# Patient Record
Sex: FEMALE | Race: BLACK OR AFRICAN AMERICAN | Employment: OTHER | URBAN - METROPOLITAN AREA
[De-identification: names, ages, dates, MRNs, and addresses within clinical notes are randomized per-mention and may not be internally consistent; named-entity substitution may affect disease eponyms.]

---

## 2024-06-28 ENCOUNTER — TELEPHONE (OUTPATIENT)
Dept: WOUND CARE | Age: 69
End: 2024-06-28

## 2024-06-28 NOTE — TELEPHONE ENCOUNTER
Late Entry:  RN called patient 6/27/24 around 4 pm to notify patient that we are unable to obtain assistance from wound care nurses from home care because the orders placed are not a skilled nursing order. Order is to paint left toe wound and right anterior lower leg wound with betadine and let air dry. Patient understood and felt this skill she was able to perform independently at home. She has no further needs at this time. Electronically signed by Alyse Mittal RN on 6/28/2024 at 9:24 AM

## 2024-08-27 ENCOUNTER — HOSPITAL ENCOUNTER (OUTPATIENT)
Dept: WOUND CARE | Age: 69
Discharge: HOME | End: 2024-08-27
Attending: NURSE PRACTITIONER

## 2024-08-29 ENCOUNTER — HOSPITAL ENCOUNTER (OUTPATIENT)
Dept: WOUND CARE | Age: 69
Discharge: HOME | End: 2024-08-29
Attending: NURSE PRACTITIONER

## 2024-09-02 ENCOUNTER — APPOINTMENT (OUTPATIENT)
Dept: GENERAL RADIOLOGY | Age: 69
DRG: 278 | End: 2024-09-02
Payer: MEDICARE

## 2024-09-02 ENCOUNTER — HOSPITAL ENCOUNTER (INPATIENT)
Age: 69
LOS: 5 days | Discharge: HOME HEALTH | DRG: 278 | End: 2024-09-07
Attending: STUDENT IN AN ORGANIZED HEALTH CARE EDUCATION/TRAINING PROGRAM | Admitting: SPECIALIST
Payer: MEDICARE

## 2024-09-02 DIAGNOSIS — I96 DRY GANGRENE (HCC): ICD-10-CM

## 2024-09-02 DIAGNOSIS — I73.9 PAD (PERIPHERAL ARTERY DISEASE) (HCC): ICD-10-CM

## 2024-09-02 DIAGNOSIS — E13.621 DIABETIC FOOT ULCER ASSOCIATED WITH OTHER SPECIFIED DIABETES MELLITUS, UNSPECIFIED LATERALITY, UNSPECIFIED PART OF FOOT, UNSPECIFIED ULCER STAGE (HCC): Primary | ICD-10-CM

## 2024-09-02 DIAGNOSIS — L97.509 DIABETIC FOOT ULCER ASSOCIATED WITH OTHER SPECIFIED DIABETES MELLITUS, UNSPECIFIED LATERALITY, UNSPECIFIED PART OF FOOT, UNSPECIFIED ULCER STAGE (HCC): Primary | ICD-10-CM

## 2024-09-02 LAB
ANION GAP SERPL CALCULATED.3IONS-SCNC: 15 MMOL/L (ref 3–16)
BASE EXCESS BLDV CALC-SCNC: 4.1 MMOL/L
BASOPHILS # BLD: 0 K/UL (ref 0–0.2)
BASOPHILS NFR BLD: 0 %
BUN SERPL-MCNC: 30 MG/DL (ref 7–20)
CALCIUM SERPL-MCNC: 9 MG/DL (ref 8.3–10.6)
CHLORIDE SERPL-SCNC: 97 MMOL/L (ref 99–110)
CO2 BLDV-SCNC: 33 MMOL/L
CO2 SERPL-SCNC: 23 MMOL/L (ref 21–32)
COHGB MFR BLDV: 7.2 %
CREAT SERPL-MCNC: 2.6 MG/DL (ref 0.6–1.2)
CRP SERPL-MCNC: 60.4 MG/L (ref 0–5.1)
DEPRECATED RDW RBC AUTO: 20.7 % (ref 12.4–15.4)
EOSINOPHIL # BLD: 0.2 K/UL (ref 0–0.6)
EOSINOPHIL NFR BLD: 3 %
ERYTHROCYTE [SEDIMENTATION RATE] IN BLOOD BY WESTERGREN METHOD: 15 MM/HR (ref 0–30)
GFR SERPLBLD CREATININE-BSD FMLA CKD-EPI: 19 ML/MIN/{1.73_M2}
GLUCOSE SERPL-MCNC: 86 MG/DL (ref 70–99)
HCO3 BLDV-SCNC: 31 MMOL/L (ref 23–29)
HCT VFR BLD AUTO: 40.7 % (ref 36–48)
HGB BLD-MCNC: 13 G/DL (ref 12–16)
LACTATE BLDV-SCNC: 1.4 MMOL/L (ref 0.4–2)
LYMPHOCYTES # BLD: 1.1 K/UL (ref 1–5.1)
LYMPHOCYTES NFR BLD: 15 %
MAGNESIUM SERPL-MCNC: 2.17 MG/DL (ref 1.8–2.4)
MCH RBC QN AUTO: 26 PG (ref 26–34)
MCHC RBC AUTO-ENTMCNC: 32 G/DL (ref 31–36)
MCV RBC AUTO: 81.3 FL (ref 80–100)
METHGB MFR BLDV: 0.5 %
MONOCYTES # BLD: 0.4 K/UL (ref 0–1.3)
MONOCYTES NFR BLD: 5 %
NEUTROPHILS # BLD: 5.5 K/UL (ref 1.7–7.7)
NEUTROPHILS NFR BLD: 77 %
O2 THERAPY: ABNORMAL
PCO2 BLDV: 54.3 MMHG (ref 40–50)
PH BLDV: 7.36 [PH] (ref 7.35–7.45)
PLATELET # BLD AUTO: 289 K/UL (ref 135–450)
PLATELET BLD QL SMEAR: ADEQUATE
PMV BLD AUTO: 6.9 FL (ref 5–10.5)
PO2 BLDV: <30 MMHG
POTASSIUM SERPL-SCNC: 3.8 MMOL/L (ref 3.5–5.1)
RBC # BLD AUTO: 5.01 M/UL (ref 4–5.2)
SAO2 % BLDV: 53 %
SLIDE REVIEW: ABNORMAL
SODIUM SERPL-SCNC: 135 MMOL/L (ref 136–145)
TROPONIN, HIGH SENSITIVITY: 350 NG/L (ref 0–14)
WBC # BLD AUTO: 7.2 K/UL (ref 4–11)

## 2024-09-02 PROCEDURE — 2580000003 HC RX 258: Performed by: SPECIALIST

## 2024-09-02 PROCEDURE — 85025 COMPLETE CBC W/AUTO DIFF WBC: CPT

## 2024-09-02 PROCEDURE — 1200000000 HC SEMI PRIVATE

## 2024-09-02 PROCEDURE — 6360000002 HC RX W HCPCS: Performed by: SPECIALIST

## 2024-09-02 PROCEDURE — 2580000003 HC RX 258: Performed by: STUDENT IN AN ORGANIZED HEALTH CARE EDUCATION/TRAINING PROGRAM

## 2024-09-02 PROCEDURE — 94640 AIRWAY INHALATION TREATMENT: CPT

## 2024-09-02 PROCEDURE — 73630 X-RAY EXAM OF FOOT: CPT

## 2024-09-02 PROCEDURE — 80048 BASIC METABOLIC PNL TOTAL CA: CPT

## 2024-09-02 PROCEDURE — 85652 RBC SED RATE AUTOMATED: CPT

## 2024-09-02 PROCEDURE — 84484 ASSAY OF TROPONIN QUANT: CPT

## 2024-09-02 PROCEDURE — 6370000000 HC RX 637 (ALT 250 FOR IP): Performed by: SPECIALIST

## 2024-09-02 PROCEDURE — 87040 BLOOD CULTURE FOR BACTERIA: CPT

## 2024-09-02 PROCEDURE — 96365 THER/PROPH/DIAG IV INF INIT: CPT

## 2024-09-02 PROCEDURE — 83036 HEMOGLOBIN GLYCOSYLATED A1C: CPT

## 2024-09-02 PROCEDURE — 73590 X-RAY EXAM OF LOWER LEG: CPT

## 2024-09-02 PROCEDURE — 36415 COLL VENOUS BLD VENIPUNCTURE: CPT

## 2024-09-02 PROCEDURE — 83735 ASSAY OF MAGNESIUM: CPT

## 2024-09-02 PROCEDURE — 96368 THER/DIAG CONCURRENT INF: CPT

## 2024-09-02 PROCEDURE — 86140 C-REACTIVE PROTEIN: CPT

## 2024-09-02 PROCEDURE — 82803 BLOOD GASES ANY COMBINATION: CPT

## 2024-09-02 PROCEDURE — 99285 EMERGENCY DEPT VISIT HI MDM: CPT

## 2024-09-02 PROCEDURE — 6360000002 HC RX W HCPCS: Performed by: STUDENT IN AN ORGANIZED HEALTH CARE EDUCATION/TRAINING PROGRAM

## 2024-09-02 PROCEDURE — 83605 ASSAY OF LACTIC ACID: CPT

## 2024-09-02 RX ORDER — OXYCODONE AND ACETAMINOPHEN 5; 325 MG/1; MG/1
1 TABLET ORAL EVERY 6 HOURS PRN
Status: DISCONTINUED | OUTPATIENT
Start: 2024-09-02 | End: 2024-09-05

## 2024-09-02 RX ORDER — BUDESONIDE AND FORMOTEROL FUMARATE DIHYDRATE 160; 4.5 UG/1; UG/1
2 AEROSOL RESPIRATORY (INHALATION)
Status: DISCONTINUED | OUTPATIENT
Start: 2024-09-02 | End: 2024-09-07 | Stop reason: HOSPADM

## 2024-09-02 RX ORDER — ALBUTEROL SULFATE 90 UG/1
2 AEROSOL, METERED RESPIRATORY (INHALATION) EVERY 6 HOURS PRN
Status: DISCONTINUED | OUTPATIENT
Start: 2024-09-02 | End: 2024-09-07 | Stop reason: HOSPADM

## 2024-09-02 RX ORDER — LINEZOLID 2 MG/ML
600 INJECTION, SOLUTION INTRAVENOUS EVERY 12 HOURS
Status: DISCONTINUED | OUTPATIENT
Start: 2024-09-03 | End: 2024-09-06

## 2024-09-02 RX ORDER — ACETAMINOPHEN 500 MG
500 TABLET ORAL EVERY 6 HOURS PRN
Status: DISCONTINUED | OUTPATIENT
Start: 2024-09-02 | End: 2024-09-07 | Stop reason: HOSPADM

## 2024-09-02 RX ORDER — LINEZOLID 2 MG/ML
600 INJECTION, SOLUTION INTRAVENOUS ONCE
Status: COMPLETED | OUTPATIENT
Start: 2024-09-02 | End: 2024-09-02

## 2024-09-02 RX ORDER — CARVEDILOL 6.25 MG/1
6.25 TABLET ORAL 2 TIMES DAILY WITH MEALS
Status: DISCONTINUED | OUTPATIENT
Start: 2024-09-02 | End: 2024-09-07 | Stop reason: HOSPADM

## 2024-09-02 RX ORDER — SODIUM CHLORIDE 9 MG/ML
INJECTION, SOLUTION INTRAVENOUS CONTINUOUS
Status: DISCONTINUED | OUTPATIENT
Start: 2024-09-02 | End: 2024-09-03

## 2024-09-02 RX ORDER — PANTOPRAZOLE SODIUM 40 MG/1
40 TABLET, DELAYED RELEASE ORAL
Status: DISCONTINUED | OUTPATIENT
Start: 2024-09-03 | End: 2024-09-07 | Stop reason: HOSPADM

## 2024-09-02 RX ORDER — ISOSORBIDE MONONITRATE 30 MG/1
30 TABLET, EXTENDED RELEASE ORAL DAILY
Status: DISCONTINUED | OUTPATIENT
Start: 2024-09-02 | End: 2024-09-07 | Stop reason: HOSPADM

## 2024-09-02 RX ORDER — FLUTICASONE PROPIONATE 50 MCG
1 SPRAY, SUSPENSION (ML) NASAL DAILY PRN
Status: DISCONTINUED | OUTPATIENT
Start: 2024-09-02 | End: 2024-09-07 | Stop reason: HOSPADM

## 2024-09-02 RX ORDER — CLOPIDOGREL BISULFATE 75 MG/1
75 TABLET ORAL DAILY
Status: DISCONTINUED | OUTPATIENT
Start: 2024-09-02 | End: 2024-09-07 | Stop reason: HOSPADM

## 2024-09-02 RX ORDER — LEVOTHYROXINE SODIUM 25 UG/1
25 TABLET ORAL DAILY
Status: DISCONTINUED | OUTPATIENT
Start: 2024-09-02 | End: 2024-09-03 | Stop reason: DRUGHIGH

## 2024-09-02 RX ORDER — ROSUVASTATIN CALCIUM 40 MG/1
40 TABLET, COATED ORAL NIGHTLY
Status: DISCONTINUED | OUTPATIENT
Start: 2024-09-02 | End: 2024-09-03 | Stop reason: ALTCHOICE

## 2024-09-02 RX ORDER — BISACODYL 5 MG/1
5 TABLET, DELAYED RELEASE ORAL DAILY PRN
Status: DISCONTINUED | OUTPATIENT
Start: 2024-09-02 | End: 2024-09-07 | Stop reason: HOSPADM

## 2024-09-02 RX ORDER — ASPIRIN 81 MG/1
81 TABLET, CHEWABLE ORAL DAILY
Status: DISCONTINUED | OUTPATIENT
Start: 2024-09-02 | End: 2024-09-07 | Stop reason: HOSPADM

## 2024-09-02 RX ORDER — GABAPENTIN 300 MG/1
300 CAPSULE ORAL
Status: DISCONTINUED | OUTPATIENT
Start: 2024-09-02 | End: 2024-09-07 | Stop reason: HOSPADM

## 2024-09-02 RX ORDER — SEVELAMER CARBONATE 800 MG/1
800 TABLET, FILM COATED ORAL
Status: DISCONTINUED | OUTPATIENT
Start: 2024-09-02 | End: 2024-09-07 | Stop reason: HOSPADM

## 2024-09-02 RX ADMIN — PIPERACILLIN AND TAZOBACTAM 3375 MG: 3; .375 INJECTION, POWDER, LYOPHILIZED, FOR SOLUTION INTRAVENOUS at 21:40

## 2024-09-02 RX ADMIN — LINEZOLID 600 MG: 600 INJECTION, SOLUTION INTRAVENOUS at 16:52

## 2024-09-02 RX ADMIN — CLOPIDOGREL BISULFATE 75 MG: 75 TABLET ORAL at 17:40

## 2024-09-02 RX ADMIN — GABAPENTIN 300 MG: 300 CAPSULE ORAL at 21:36

## 2024-09-02 RX ADMIN — BUDESONIDE AND FORMOTEROL FUMARATE DIHYDRATE 2 PUFF: 160; 4.5 AEROSOL RESPIRATORY (INHALATION) at 20:51

## 2024-09-02 RX ADMIN — LEVOTHYROXINE SODIUM 25 MCG: 0.03 TABLET ORAL at 17:40

## 2024-09-02 RX ADMIN — ISOSORBIDE MONONITRATE 30 MG: 30 TABLET, EXTENDED RELEASE ORAL at 17:39

## 2024-09-02 RX ADMIN — CARVEDILOL 6.25 MG: 6.25 TABLET, FILM COATED ORAL at 17:40

## 2024-09-02 RX ADMIN — PIPERACILLIN AND TAZOBACTAM 4500 MG: 4; .5 INJECTION, POWDER, FOR SOLUTION INTRAVENOUS at 16:21

## 2024-09-02 RX ADMIN — ROSUVASTATIN CALCIUM 40 MG: 40 TABLET, FILM COATED ORAL at 21:38

## 2024-09-02 RX ADMIN — OXYCODONE HYDROCHLORIDE AND ACETAMINOPHEN 1 TABLET: 5; 325 TABLET ORAL at 16:21

## 2024-09-02 RX ADMIN — SODIUM CHLORIDE: 9 INJECTION, SOLUTION INTRAVENOUS at 17:47

## 2024-09-02 RX ADMIN — ASPIRIN 81 MG: 81 TABLET, CHEWABLE ORAL at 17:40

## 2024-09-02 ASSESSMENT — PAIN SCALES - GENERAL
PAINLEVEL_OUTOF10: 4
PAINLEVEL_OUTOF10: 8
PAINLEVEL_OUTOF10: 9

## 2024-09-02 ASSESSMENT — PAIN DESCRIPTION - ORIENTATION: ORIENTATION: RIGHT

## 2024-09-02 ASSESSMENT — PAIN DESCRIPTION - DESCRIPTORS
DESCRIPTORS: ACHING;THROBBING
DESCRIPTORS: ACHING;SHARP

## 2024-09-02 ASSESSMENT — PAIN DESCRIPTION - PAIN TYPE
TYPE: ACUTE PAIN
TYPE: ACUTE PAIN

## 2024-09-02 ASSESSMENT — PAIN DESCRIPTION - LOCATION
LOCATION: FOOT;LEG
LOCATION: LEG
LOCATION: FOOT;LEG

## 2024-09-02 ASSESSMENT — LIFESTYLE VARIABLES
HOW OFTEN DO YOU HAVE A DRINK CONTAINING ALCOHOL: NEVER
HOW MANY STANDARD DRINKS CONTAINING ALCOHOL DO YOU HAVE ON A TYPICAL DAY: PATIENT DOES NOT DRINK

## 2024-09-02 ASSESSMENT — PAIN - FUNCTIONAL ASSESSMENT
PAIN_FUNCTIONAL_ASSESSMENT: PREVENTS OR INTERFERES SOME ACTIVE ACTIVITIES AND ADLS
PAIN_FUNCTIONAL_ASSESSMENT: ACTIVITIES ARE NOT PREVENTED
PAIN_FUNCTIONAL_ASSESSMENT: 0-10

## 2024-09-02 ASSESSMENT — PAIN DESCRIPTION - FREQUENCY
FREQUENCY: CONTINUOUS
FREQUENCY: CONTINUOUS

## 2024-09-02 NOTE — ED NOTES
ED medic went to room to draw septic lab workup. Patient went to her car. Dr Pereira stated it was ok. Lags delayed at this time.

## 2024-09-02 NOTE — ED PROVIDER NOTES
• Not on file   Tobacco Use   • Smoking status: Every Day     Current packs/day: 0.50     Average packs/day: 0.5 packs/day for 47.0 years (23.5 ttl pk-yrs)     Types: Cigarettes   • Smokeless tobacco: Never   Vaping Use   • Vaping status: Never Used   Substance and Sexual Activity   • Alcohol use: No   • Drug use: No   • Sexual activity: Not Currently     Partners: Male   Other Topics Concern   • Not on file   Social History Narrative   • Not on file     Social Determinants of Health     Financial Resource Strain: Low Risk  (8/4/2023)    Received from [facility]    Overall Financial Resource Strain (CARDIA)    • Difficulty of Paying Living Expenses: Not very hard   Food Insecurity: No Food Insecurity (7/25/2024)    Hunger Vital Sign    • Worried About Running Out of Food in the Last Year: Never true    • Ran Out of Food in the Last Year: Never true   Transportation Needs: No Transportation Needs (7/25/2024)    PRAPARE - Transportation    • Lack of Transportation (Medical): No    • Lack of Transportation (Non-Medical): No   Physical Activity: Inactive (8/4/2023)    Received from [facility]    Exercise Vital Sign    • Days of Exercise per Week: 0 days    • Minutes of Exercise per Session: 0 min   Stress: Not on file   Social Connections: Not on file   Intimate Partner Violence: Unknown (1/20/2024)    [facility]     Interpersonal Safety    • Feel physically or emotionally unsafe where currently live: Not on file    • Harm by anyone: Not on file    • Emotionally Harmed: Not on file   Housing Stability: Low Risk  (7/25/2024)    Housing Stability Vital Sign    • Unable to Pay for Housing in the Last Year: No    • Number of Places Lived in the Last Year: 1    • Unstable Housing in the Last Year: No       REVIEW OF SYSTEMS    As per HPI    PHYSICAL EXAM    BP (!) 146/76   Pulse 81   Temp 98 °F (36.7 °C) (Oral)    fasciitis given patient's chronicity and overall stability I do believe is reasonable for patient to be admitted here for IV antibiotics and podiatry consultation the morning. [BT]   1610 I was able to Doppler bilateral PT pulses. [BT]   1704 Patient's workup here does show evidence of creatinine 2.6 but does appear to have history of underlying CKD near baseline.  Troponin here mildly elevated at 350 does appear to have troponin elevation at baseline, denies any active chest pain at this time would not heparinize. [BT]      ED Course User Index  [BT] Luiz Pereira MD       Impression: Dry gangrene    Is this patient to be included in the SEP-1 core measure? No Exclusion criteria - the patient is NOT to be included for SEP-1 Core Measure due to: 2+ SIRS criteria are not met     Patient's results were discussed with the patient. Patient's questions were answered. I reviewed the patients medical records and noted there allergies, past medical history, and previous visits, pertinent information summarized in HPI. I reviewed the nursing notes.    Feel that patient requires admission to medicine for further workup and management.          Luiz Pereira MD  09/02/24 4923

## 2024-09-02 NOTE — PROGRESS NOTES
Pt arrived to unit via stretcher. Pulled over to bed x3. VS recorded. Pt oriented to room and call light. PT resting with call light in reach

## 2024-09-02 NOTE — PROGRESS NOTES
4 Eyes Skin Assessment     NAME:  Shanna Mejia  YOB: 1955  MEDICAL RECORD NUMBER:  [id number]    The patient is being assessed for  Admission    I agree that at least one RN has performed a thorough Head to Toe Skin Assessment on the patient. ALL assessment sites listed below have been assessed.      Areas assessed by both nurses:    Head, Face, Ears, Shoulders, Back, Chest, Arms, Elbows, Hands, Sacrum. Buttock, Coccyx, Ischium, and Legs. Feet and Heels        Does the Patient have a Wound? Yes wound(s) were present on assessment. LDA wound assessment was Initiated and completed by RN       Philippe Prevention initiated by RN: No  Wound Care Orders initiated by RN: No    Pressure Injury (Stage 3,4, Unstageable, DTI, NWPT, and Complex wounds) if present, place Wound referral order by RN under : Yes    New Ostomies, if present place, Ostomy referral order under : No     Nurse 1 eSignature: Electronically signed by Mita Euceda RN on 9/2/24 at 6:46 PM EDT    **SHARE this note so that the co-signing nurse can place an eSignature**    Nurse 2 eSignature: Electronically signed by Justin N. Schoenung, RN on 9/2/24 at 6:47 PM EDT

## 2024-09-03 PROBLEM — E11.621 DIABETIC FOOT ULCER (HCC): Status: ACTIVE | Noted: 2024-09-03

## 2024-09-03 PROBLEM — L97.509 DIABETIC FOOT ULCER (HCC): Status: ACTIVE | Noted: 2024-09-03

## 2024-09-03 LAB
BACTERIA URNS QL MICRO: ABNORMAL /HPF
BILIRUB UR QL STRIP.AUTO: NEGATIVE
CLARITY UR: ABNORMAL
COLOR UR: ABNORMAL
EPI CELLS #/AREA URNS AUTO: 17 /HPF (ref 0–5)
EST. AVERAGE GLUCOSE BLD GHB EST-MCNC: 128.4 MG/DL
GLUCOSE UR STRIP.AUTO-MCNC: NEGATIVE MG/DL
HBA1C MFR BLD: 6.1 %
HGB UR QL STRIP.AUTO: NEGATIVE
HYALINE CASTS #/AREA URNS AUTO: 2 /LPF (ref 0–8)
KETONES UR STRIP.AUTO-MCNC: NEGATIVE MG/DL
LEUKOCYTE ESTERASE UR QL STRIP.AUTO: NEGATIVE
NITRITE UR QL STRIP.AUTO: NEGATIVE
PH UR STRIP.AUTO: 5 [PH] (ref 5–8)
PROT UR STRIP.AUTO-MCNC: 300 MG/DL
RBC CLUMPS #/AREA URNS AUTO: 2 /HPF (ref 0–4)
SP GR UR STRIP.AUTO: 1.02 (ref 1–1.03)
UA COMPLETE W REFLEX CULTURE PNL UR: ABNORMAL
UA DIPSTICK W REFLEX MICRO PNL UR: YES
URN SPEC COLLECT METH UR: ABNORMAL
UROBILINOGEN UR STRIP-ACNC: 1 E.U./DL
WBC #/AREA URNS AUTO: 1 /HPF (ref 0–5)

## 2024-09-03 PROCEDURE — 1200000000 HC SEMI PRIVATE

## 2024-09-03 PROCEDURE — 94640 AIRWAY INHALATION TREATMENT: CPT

## 2024-09-03 PROCEDURE — 99223 1ST HOSP IP/OBS HIGH 75: CPT | Performed by: STUDENT IN AN ORGANIZED HEALTH CARE EDUCATION/TRAINING PROGRAM

## 2024-09-03 PROCEDURE — 97530 THERAPEUTIC ACTIVITIES: CPT

## 2024-09-03 PROCEDURE — 6370000000 HC RX 637 (ALT 250 FOR IP): Performed by: SPECIALIST

## 2024-09-03 PROCEDURE — 6360000002 HC RX W HCPCS: Performed by: SPECIALIST

## 2024-09-03 PROCEDURE — 2580000003 HC RX 258: Performed by: INTERNAL MEDICINE

## 2024-09-03 PROCEDURE — 99222 1ST HOSP IP/OBS MODERATE 55: CPT | Performed by: INTERNAL MEDICINE

## 2024-09-03 PROCEDURE — 97162 PT EVAL MOD COMPLEX 30 MIN: CPT

## 2024-09-03 PROCEDURE — 97535 SELF CARE MNGMENT TRAINING: CPT

## 2024-09-03 PROCEDURE — 81001 URINALYSIS AUTO W/SCOPE: CPT

## 2024-09-03 PROCEDURE — 2580000003 HC RX 258: Performed by: SPECIALIST

## 2024-09-03 PROCEDURE — 6360000002 HC RX W HCPCS: Performed by: INTERNAL MEDICINE

## 2024-09-03 PROCEDURE — 94760 N-INVAS EAR/PLS OXIMETRY 1: CPT

## 2024-09-03 PROCEDURE — 97166 OT EVAL MOD COMPLEX 45 MIN: CPT

## 2024-09-03 RX ORDER — HEPARIN SODIUM 1000 [USP'U]/ML
1000 INJECTION, SOLUTION INTRAVENOUS; SUBCUTANEOUS
Status: DISCONTINUED | OUTPATIENT
Start: 2024-09-04 | End: 2024-09-07 | Stop reason: HOSPADM

## 2024-09-03 RX ORDER — ATORVASTATIN CALCIUM 40 MG/1
40 TABLET, FILM COATED ORAL NIGHTLY
Status: DISCONTINUED | OUTPATIENT
Start: 2024-09-03 | End: 2024-09-07 | Stop reason: HOSPADM

## 2024-09-03 RX ORDER — ATORVASTATIN CALCIUM 40 MG/1
40 TABLET, FILM COATED ORAL DAILY
COMMUNITY

## 2024-09-03 RX ORDER — CALCITRIOL 0.25 UG/1
2.5 CAPSULE, LIQUID FILLED ORAL
Status: DISCONTINUED | OUTPATIENT
Start: 2024-09-04 | End: 2024-09-07 | Stop reason: HOSPADM

## 2024-09-03 RX ADMIN — CLOPIDOGREL BISULFATE 75 MG: 75 TABLET ORAL at 07:48

## 2024-09-03 RX ADMIN — SEVELAMER CARBONATE 800 MG: 800 TABLET, FILM COATED ORAL at 07:48

## 2024-09-03 RX ADMIN — PIPERACILLIN AND TAZOBACTAM 3375 MG: 3; .375 INJECTION, POWDER, LYOPHILIZED, FOR SOLUTION INTRAVENOUS at 18:40

## 2024-09-03 RX ADMIN — BUDESONIDE AND FORMOTEROL FUMARATE DIHYDRATE 2 PUFF: 160; 4.5 AEROSOL RESPIRATORY (INHALATION) at 08:34

## 2024-09-03 RX ADMIN — LEVOTHYROXINE SODIUM 25 MCG: 0.03 TABLET ORAL at 06:09

## 2024-09-03 RX ADMIN — ASPIRIN 81 MG: 81 TABLET, CHEWABLE ORAL at 07:48

## 2024-09-03 RX ADMIN — CARVEDILOL 6.25 MG: 6.25 TABLET, FILM COATED ORAL at 07:48

## 2024-09-03 RX ADMIN — BUDESONIDE AND FORMOTEROL FUMARATE DIHYDRATE 2 PUFF: 160; 4.5 AEROSOL RESPIRATORY (INHALATION) at 20:34

## 2024-09-03 RX ADMIN — CARVEDILOL 6.25 MG: 6.25 TABLET, FILM COATED ORAL at 17:45

## 2024-09-03 RX ADMIN — PIPERACILLIN AND TAZOBACTAM 3375 MG: 3; .375 INJECTION, POWDER, LYOPHILIZED, FOR SOLUTION INTRAVENOUS at 06:05

## 2024-09-03 RX ADMIN — PANTOPRAZOLE SODIUM 40 MG: 40 TABLET, DELAYED RELEASE ORAL at 06:09

## 2024-09-03 RX ADMIN — LINEZOLID 600 MG: 600 INJECTION, SOLUTION INTRAVENOUS at 04:24

## 2024-09-03 RX ADMIN — SEVELAMER CARBONATE 800 MG: 800 TABLET, FILM COATED ORAL at 17:45

## 2024-09-03 RX ADMIN — LINEZOLID 600 MG: 600 INJECTION, SOLUTION INTRAVENOUS at 16:12

## 2024-09-03 RX ADMIN — ISOSORBIDE MONONITRATE 30 MG: 30 TABLET, EXTENDED RELEASE ORAL at 07:48

## 2024-09-03 RX ADMIN — ATORVASTATIN CALCIUM 40 MG: 40 TABLET, FILM COATED ORAL at 21:05

## 2024-09-03 RX ADMIN — OXYCODONE HYDROCHLORIDE AND ACETAMINOPHEN 1 TABLET: 5; 325 TABLET ORAL at 16:05

## 2024-09-03 ASSESSMENT — PAIN SCALES - GENERAL
PAINLEVEL_OUTOF10: 0
PAINLEVEL_OUTOF10: 9

## 2024-09-03 ASSESSMENT — PAIN DESCRIPTION - ORIENTATION: ORIENTATION: RIGHT;LEFT

## 2024-09-03 ASSESSMENT — PAIN DESCRIPTION - LOCATION: LOCATION: FOOT;LEG

## 2024-09-03 ASSESSMENT — PAIN DESCRIPTION - DESCRIPTORS: DESCRIPTORS: ACHING

## 2024-09-03 NOTE — PROGRESS NOTES
Medication Reconciliation    List of medications patient is currently taking is complete.     Source of information: 1. Conversation with patient                                      2. EPIC records      Allergies  Patient has no known allergies.     Notes regarding home medications:   1. Patient reports she has been switched from rosuvastatin to atorvastatin   2. She also repots she only uses her Symbicort and albuterol inhaler. She is no longer using the Spiriva inhaler   3. The last dose of her home medication PTA was 9/1       Souleymane Vasques RP   9/3/2024  10:35 AM

## 2024-09-03 NOTE — PROGRESS NOTES
Physical Therapy  Facility/Department: [address] MED SURG  Physical Therapy Initial Assessment    Name: Shanna Mejia  : 1955  MRN: [id number]  Date of Service: 9/3/2024    Discharge Recommendations:  Therapy recommended at discharge, Patient would benefit from continued therapy after discharge (3-5X frequency)   PT Equipment Recommendations  Equipment Needed: No  Other: defer to next level of care    Shanna Mejia scored a  on the AM-PAC short mobility form. Current research shows that an AM-PAC score of 17 or less is typically not associated with a discharge to the patient's home setting. Based on the patient's AM-PAC score and their current functional mobility deficits, it is recommended that the patient have 3-5 sessions per week of Physical Therapy at d/c to increase the patient's independence.  Please see assessment section for further patient specific details.    If patient discharges prior to next session this note will serve as a discharge summary.  Please see below for the latest assessment towards goals.     Patient Diagnosis(es): The primary encounter diagnosis was Diabetic foot ulcer associated with other specified diabetes mellitus, unspecified laterality, unspecified part of foot, unspecified ulcer stage (HCC). Diagnoses of Dry gangrene ([facility]) and PAD (peripheral artery disease) ([facility]) were also pertinent to this visit.  Past Medical History:  has a past medical history of Carotid artery stenosis, CHF (congestive heart failure) (HCC), CKD (chronic kidney disease), Coronary artery disease, Diabetes mellitus (HCC), Dilated cardiomyopathy (HCC), Gout, Hemodialysis patient (HCC), Hyperlipidemia, Hypertension, Hypothyroidism, Ischemic stroke (HCC), Lung nodule, Neuropathy, Osteopenia, Peripheral arterial disease (HCC), Peripheral neuropathy, and Tobacco use disorder.  Past Surgical History:  has a past surgical history that includes Carotid endarterectomy (Left); Tubal ligation; Hysterectomy; Breast  the last 3 weeks the wounds are not improving  \" (Luiz Pereira MD  09/02/24 6705)  Response To Previous Treatment: Not applicable  Family / Caregiver Present: No  Referring Practitioner: Jeffery Wills MD  Referral Date : 09/03/24  Diagnosis: dry gangrene  Follows Commands: Within Functional Limits  Subjective  Subjective: Pt supine in bed, complaints of significant pain and weakness needing motivation to participate with therapy. Agreeable to PT evaluation.         Social/Functional History  Social/Functional History  Lives With: Alone  Type of Home: Apartment  Home Layout: One level  Home Access: Stairs to enter with rails  Entrance Stairs - Number of Steps: 3 outside, 7 steps inside descending to lower level (family always with pt on steps in/out)  Bathroom Shower/Tub: Tub/Shower unit (Pt takes tub baths)  Bathroom Toilet: Handicap height (RTS over toilet)  Bathroom Equipment: Toilet raiser, Shower chair  Home Equipment: Walker - Rolling, Electric scooter  Has the patient had two or more falls in the past year or any fall with injury in the past year?: No  Receives Help From: Home health ( PT/OT/aide following last hospital admit)  ADL Assistance: Independent (Assist with getting out of the tub)  Homemaking Assistance: Needs assistance (HH aide for most)  Ambulation Assistance: Independent (RW or uses electric w/c in apartment)  Transfer Assistance: Independent  Active : No  Patient's  Info: medical transport to/from HD. Family come over each time to assist with steps to car  Leisure & Hobbies: watch TV. Used to go to the [employer]  Vision/Hearing  Vision  Vision: Impaired  Vision Exceptions: Wears glasses for reading  Hearing  Hearing: Within functional limits    Cognition   Orientation  Overall Orientation Status: Within Functional Limits  Cognition  Overall Cognitive Status: WFL    Objective       Observation/Palpation  Posture: Fair  Observation: flexed posture  guarded due to increased  a chair using your arms?: A Lot  How much help is needed walking in hospital room?: Total  How much help is needed climbing 3-5 steps with a railing?: Total  AM-PAC Inpatient Mobility Raw Score : 12  AM-PAC Inpatient T-Scale Score : 35.33  Mobility Inpatient CMS 0-100% Score: 68.66  Mobility Inpatient CMS G-Code Modifier : CL        Goals  Short Term Goals  Time Frame for Short Term Goals: Prior to discharge  Short Term Goal 1: Bed mobility CGA  Short Term Goal 2: Transfer sit <-> stand to LRAD min assist  Short Term Goal 3: Pt will be able to perform lateral transfer with LRAD min assist  Short Term Goal 4: Pt will be able to ambulate with LRAD 15' with min assist  Long Term Goals  Time Frame for Long Term Goals : STG=LTG  Patient Goals   Patient Goals : \"return home\"       Education  Patient Education  Education Given To: Patient  Education Provided: Role of Therapy;Plan of Care;Transfer Training;Energy Conservation;Equipment  Education Method: Demonstration;Verbal  Barriers to Learning: None  Education Outcome: Verbalized understanding;Demonstrated understanding      Therapy Time   Individual Concurrent Group Co-treatment   Time In 1355         Time Out 1435         Minutes 40         Timed Code Treatment Minutes: 25 Minutes       Molly Parker, PT     Molly Parker PT, DPT, CNS [id number] 09/03/24 2:46 PM

## 2024-09-03 NOTE — CARE COORDINATION
Case Management Assessment  Initial Evaluation    Date/Time of Evaluation: 9/3/2024 2:53 PM  Assessment Completed by: Christiana Murray RN    If patient is discharged prior to next notation, then this note serves as note for discharge by case management.    Patient Name: Shanna Mejia                     YOB: 1955  Diagnosis: Dry gangrene (HCC) [I96]  Diabetic foot ulcer associated with other specified diabetes mellitus, unspecified laterality, unspecified part of foot, unspecified ulcer stage (HCC) [E13.621, L97.509]                     Date / Time: 9/2/2024 12:56 PM    Patient Admission Status: Inpatient   Readmission Risk (Low < 19, Mod (19-27), High > 27): Readmission Risk Score: 22.9    Current PCP: Sona Thomas MD  PCP verified by CM? (P) Yes    Chart Reviewed: Yes      History Provided by: (P) Patient  Patient Orientation: (P) Alert and Oriented    Patient Cognition: (P) Alert    Hospitalization in the last 30 days (Readmission):  No    If yes, Readmission Assessment in  Navigator will be completed.    Advance Directives:      Code Status: Full Code   Patient's Primary Decision Maker is: (P) Legal Next of Kin    Primary Decision Maker: Christen Lima - Child - [phone]    Secondary Decision Maker: Malini Pendleton - Child - [phone]    Secondary Decision Maker: VuKatien - Child - [phone]    Discharge Planning:    Patient lives with: (P) Alone Type of Home: (P) Apartment  Primary Care Giver:    Patient Support Systems include: (P) Children, Homemaker, Home Care Staff   Current Financial resources: (P) Medicare  Current community resources: (P) ECF/Home Care (HHA 2h, 2xwk)  Current services prior to admission: (P) Home Care, PRAKASH/Passport, Other (Comment), Meals On Wheels (Hemodialysis [facility] on MWF at 7am-11am)            Current DME: W/C, Elec W/C, Walker, BSC, Cane, Shower Chair, Walker             Type of Home Care services:  (P) Nursing Services,  From home with ex- who is her primary caregiver. Daughter assists as well. She is active with [facility] for SN-wound care and [facility] for HHA 2h/day, 2xwk. She goes to HD on MWF at [facility]. Her ex- transports her. Plan TBD depending on outcome of vascular surgery.    Community Service Affiliation:   Dialysis:  Yes   Location: [facility]  Days of week:  MWF from 7am-11am        Home Care Information:   Currently active with Home Health Care : Yes   Home Care Agency: Discharging to Facility/ Agency   Name: [facility]  Address:  [address], [place] [id number]   Phone:  [phone]  Fax:  [phone]                Services: SN  Passport/Waiver : Yes                      Passport/ Waiver Services: Homemaking Services              The Plan for Transition of Care is related to the following treatment goals of Dry gangrene (HCC) [I96]  Diabetic foot ulcer associated with other specified diabetes mellitus, unspecified laterality, unspecified part of foot, unspecified ulcer stage (HCC) [E13.621, L97.509]    IF APPLICABLE: The Patient and/or patient representative Shanna and her family were provided with a choice of provider and agrees with the discharge plan. Freedom of choice list with basic dialogue that supports the patient's individualized plan of care/goals and shares the quality data associated with the providers was provided to: (P) Patient   Patient Representative Name:       The Patient and/or Patient Representative Agree with the Discharge Plan? (P) Yes    Christiana Murray RN  Case Management Department  Ph: [phone]      09/03/24 7968   Service Assessment   Patient Orientation Alert and Oriented   Cognition Alert   History Provided By Patient   Accompanied By/Relationship no one   Support Systems Children;Homemaker;Home Care Staff   Patient's Healthcare Decision Maker is: Legal Next of Kin   PCP Verified by CM Yes   Last Visit to PCP Within

## 2024-09-03 NOTE — CONSULTS
Inpatient Note    CC: right foot gangrene  Summary:   Shanna Mejia is being seen by nephrology for ESRD. She is a 68 y.o. female with a PMH significant for CHF, HTN, peripheral vascular disease, active smoker, T2DM who presented to the ED 9/2/2024 with worsening right foot gangrenous ulcers and pain. She is admitted for further management.    Interval History  Seen at bedside  /88  On room air  Labs reviewed.  Hb 13.0  No acute electrolyte abnormalities.    Plan:   - no acute indications for HD today  - plan for HD tomorrow per MWF schedule.  - BP elevated. Continue home antihypertenives.  - stop IVF    Assessment:   ESRD:  - on HD MWF at [facility]  - Access: TDC  - EDW 56 kg    Hypertension:  - home regimen: carvedilol 6.25 mg BID, imdur 30 mg daily  - LVEF 40% (10/2023)    SHPTH:  - gets calcitriol 2.5 mcg qHD as outpatient. Continue  - renvela for hyperphosphatemia    PVD:  - chronic gangrenous wounds to RLE with acute worsening  - per podiatry and vascular surgery.      Jaspreet Farfan MD  [facility]  Office: [phone]          PE:   Vitals:    09/03/24 0834   BP:    Pulse:    Resp:    Temp:    SpO2: 92%       General appearance: in NAD  Respiratory: Respiratory effort appears normal, bilateral equal chest rise, no wheeze, no crackles  Cardiovascular: Ausculation shows RRR + edema  Abdomen: No visible mass or tenderness, non distended.

## 2024-09-03 NOTE — CONSULTS
Department of Podiatry Consult Note  Attending       Reason for Consult:  Ulcerations to feet/legs  Requesting Physician:  MD Kelli    CHIEF COMPLAINT:  My feet and legs hurt, I cannot walk    HISTORY OF PRESENT ILLNESS:                The patient is a 68 y.o. female with significant past medical history of Severe PAD, CKD, CHF, and DM2 was scheduled for outpatient angio with Dr. Thurston for 9/19/24 and was to be seen in my office today for f/u  who is consulted for management of painful leg and foot ulcerations. Patient states the swelling became really bad and pain increased until she could not longer walk. Rates pain 8/10, denies f/c/n/v.     Past Medical History:        Diagnosis Date    Carotid artery stenosis     CHF (congestive heart failure) (HCC)     CKD (chronic kidney disease)     Coronary artery disease     Diabetes mellitus (HCC)     on no meds -[id number]    Dilated cardiomyopathy (HCC)     Gout     Hemodialysis patient (HCC)     Hyperlipidemia     Hypertension     Hypothyroidism     Ischemic stroke (HCC)     2016    Lung nodule     Neuropathy     Osteopenia     Peripheral arterial disease (HCC)     Peripheral neuropathy     Tobacco use disorder      Past Surgical History:        Procedure Laterality Date    CARDIAC CATHETERIZATION      CARDIAC PROCEDURE N/A 7/26/2024    Peripheral angiography performed by Maximo Boo MD at [facility] CARDIAC CATH LAB    CAROTID ENDARTERECTOMY Left     2017    CATARACT REMOVAL Right 05/2022    CORONARY ANGIOPLASTY WITH STENT PLACEMENT  03/2019    X 1    CYST INCISION AND DRAINAGE      right arm    DIALYSIS FISTULA CREATION Left 7/11/2024    LEFT ARM ARTERIOVENOUS FISTULA CREATION performed by Dennis Sotomayor II, MD at [facility] OR    HYSTERECTOMY (CERVIX STATUS UNKNOWN)      INTRACAPSULAR CATARACT EXTRACTION Left 07/30/2020    PHACOEMULSIFICATION WITH INTRAOCULAR LENS IMPLANT performed by Akira Reza MD at [facility]    INTRACAPSULAR CATARACT

## 2024-09-03 NOTE — PLAN OF CARE
Problem: Discharge Planning  Goal: Discharge to home or other facility with appropriate resources  9/3/2024 0757 by Avis Umaña RN  Outcome: Progressing  9/3/2024 0008 by Alma Cline RN  Outcome: Progressing  Flowsheets (Taken 9/2/2024 2242)  Discharge to home or other facility with appropriate resources: Identify barriers to discharge with patient and caregiver     Problem: Pain  Goal: Verbalizes/displays adequate comfort level or baseline comfort level  9/3/2024 0757 by Avis Umaña RN  Outcome: Progressing  9/3/2024 0008 by Alma Cline RN  Outcome: Progressing     Problem: Safety - Adult  Goal: Free from fall injury  9/3/2024 0757 by Avis Umaña RN  Outcome: Progressing  9/3/2024 0008 by Alma Cline RN  Outcome: Progressing  Flowsheets (Taken 9/2/2024 2239)  Free From Fall Injury: Instruct family/caregiver on patient safety     Problem: Skin/Tissue Integrity  Goal: Absence of new skin breakdown  Description: 1.  Monitor for areas of redness and/or skin breakdown  2.  Assess vascular access sites hourly  3.  Every 4-6 hours minimum:  Change oxygen saturation probe site  4.  Every 4-6 hours:  If on nasal continuous positive airway pressure, respiratory therapy assess nares and determine need for appliance change or resting period.  9/3/2024 0757 by Avis Umaña RN  Outcome: Progressing  9/3/2024 0008 by Alma Cline RN  Outcome: Progressing     Problem: ABCDS Injury Assessment  Goal: Absence of physical injury  9/3/2024 0757 by Avis Umaña RN  Outcome: Progressing  9/3/2024 0008 by Alma Cline RN  Outcome: Progressing

## 2024-09-03 NOTE — PROGRESS NOTES
Occupational Therapy  Facility/Department: [address] MED SURG  Occupational Therapy Initial Assessment    Name: Shanna Mejia  : 1955  MRN: [id number]  Date of Service: 9/3/2024    Discharge Recommendations:  3-5 sessions per week, Patient would benefit from continued therapy after discharge     Shanna Mejia scored a 16/24 on the AM-PAC ADL Inpatient form. Current research shows that an AM-PAC score of 17 or less is typically not associated with a discharge to the patient's home setting. Based on the patient's AM-PAC score and their current ADL deficits, it is recommended that the patient have 3-5 sessions per week of Occupational Therapy at d/c to increase the patient's independence.  Please see assessment section for further patient specific details.    If patient discharges prior to next session this note will serve as a discharge summary.  Please see below for the latest assessment towards goals.      Patient Diagnosis(es): The primary encounter diagnosis was Diabetic foot ulcer associated with other specified diabetes mellitus, unspecified laterality, unspecified part of foot, unspecified ulcer stage ([facility]). Diagnoses of Dry gangrene ([facility]) and PAD (peripheral artery disease) ([facility]) were also pertinent to this visit.  Past Medical History:  has a past medical history of Carotid artery stenosis, CHF (congestive heart failure) ([facility]), CKD (chronic kidney disease), Coronary artery disease, Diabetes mellitus (HCC), Dilated cardiomyopathy (HCC), Gout, Hemodialysis patient ([facility]), Hyperlipidemia, Hypertension, Hypothyroidism, Ischemic stroke ([facility]), Lung nodule, Neuropathy, Osteopenia, Peripheral arterial disease ([facility]), Peripheral neuropathy, and Tobacco use disorder.  Past Surgical History:  has a past surgical history that includes Carotid endarterectomy (Left); Tubal ligation; Hysterectomy; Breast cyst incision and drainage; vitrectomy (Left, 2019); Refractive surgery (Left, 2019); Coronary angioplasty  with stent (03/2019); Cardiac catheterization; Intracapsular cataract extraction (Left, 07/30/2020); Intracapsular cataract extraction (Right, 05/13/2022); Cataract removal (Right, 05/2022); IR TUNNELED CVC PLACE WO SQ PORT/PUMP > 5 YEARS (Right, 03/13/2024); IR TUNNELED CVC PLACE WO SQ PORT/PUMP > 5 YEARS (3/13/2024); Dialysis fistula creation (Left, 7/11/2024); Cardiac procedure (N/A, 7/26/2024); and invasive vascular (N/A, 7/30/2024).    Treatment Diagnosis: Decreased: ADLs, functional transfers/mobility      Assessment  Performance deficits / Impairments: Decreased functional mobility ;Decreased ADL status;Decreased endurance;Decreased strength  Assessment: Pt is a 69 yo F who presented with worsening chronic right foot and shin wound. At baseline, pt lives alone in an apt where she is typically independent in self-care and completes functional mobility using electric scooter in the home vs RW in community. Reports having HH aide assist for most IADLs. She is below baseline at this time, most limited by pain in B LE (worse R>L), and requiring min A for bed mobility and mod A for unsuccessful transfer attempt in Stedy. She required total A for LB dressing and setup/SBA for seated grooming. Will continue to follow while hospitalized. Anticipate need for ongoing skilled OT 3-5x/wk at d/c to improve her safety, independence, and promote return to PLOF.  Treatment Diagnosis: Decreased: ADLs, functional transfers/mobility  Prognosis: Fair  Decision Making: Medium Complexity  REQUIRES OT FOLLOW-UP: Yes  Activity Tolerance  Activity Tolerance: Patient limited by pain     Plan  Occupational Therapy Plan  Times Per Week: 3-5  Times Per Day: Once a day  Current Treatment Recommendations: Strengthening, ROM, Functional mobility training, Endurance training, Patient/Caregiver education & training, Equipment evaluation, education, & procurement, Safety education & training, Self-Care /  reach;Patient at risk for falls;Nurse notified        AROM: Within functional limits  Strength: Generally decreased, functional  ADL  Grooming: Setup;Stand by assistance  Grooming Skilled Clinical Factors: Pt washed face and used mouthwash sitting EOB  LE Dressing: Dependent/Total  LE Dressing Skilled Clinical Factors: Assist to don footies  Functional Mobility: Unable to assess (Comment)  Functional Mobility Skilled Clinical Factors: Pt unable to tolerate standing in Stedy d/t pain  Additional Comments: Anticipate pt would be IND w/ feeding, SBA UB ADLs, mod-max A LB ADLs and toileting based on ROM, strength, endurance this session     Activity Tolerance  Activity Tolerance: Patient tolerated evaluation without incident;Patient limited by pain;Patient limited by endurance  Bed mobility  Supine to Sit: Stand by assistance  Sit to Supine: Minimal assistance  Transfers  Sit to stand: Moderate assistance  Stand to sit: Moderate assistance  Transfer Comments: to/from Stedy for partial stance; unable to tolerate standing upright enough to place paddles of Stedy seat under pt  Vision  Vision: Impaired  Vision Exceptions: Wears glasses for reading  Hearing  Hearing: Within functional limits  Cognition  Overall Cognitive Status: WFL  Orientation  Overall Orientation Status: Within Functional Limits                  Education Given To: Patient  Education Provided: Role of Therapy;ADL Adaptive Strategies;Transfer Training  Education Method: Verbal;Demonstration  Barriers to Learning: None  Education Outcome: Verbalized understanding;Continued education needed     AM-PAC - ADL  AM-PAC Daily Activity - Inpatient   How much help is needed for putting on and taking off regular lower body clothing?: Total  How much help is needed for bathing (which includes washing, rinsing, drying)?: A Lot  How much help is needed for toileting (which includes using toilet, bedpan, or urinal)?: Total  How much help is needed for putting on and

## 2024-09-03 NOTE — PLAN OF CARE
Problem: Discharge Planning  Goal: Discharge to home or other facility with appropriate resources  Outcome: Progressing  Flowsheets (Taken 9/2/2024 2242)  Discharge to home or other facility with appropriate resources: Identify barriers to discharge with patient and caregiver     Problem: Pain  Goal: Verbalizes/displays adequate comfort level or baseline comfort level  Outcome: Progressing     Problem: Safety - Adult  Goal: Free from fall injury  Outcome: Progressing  Flowsheets (Taken 9/2/2024 2230)  Free From Fall Injury: Instruct family/caregiver on patient safety     Problem: Skin/Tissue Integrity  Goal: Absence of new skin breakdown  Description: 1.  Monitor for areas of redness and/or skin breakdown  2.  Assess vascular access sites hourly  3.  Every 4-6 hours minimum:  Change oxygen saturation probe site  4.  Every 4-6 hours:  If on nasal continuous positive airway pressure, respiratory therapy assess nares and determine need for appliance change or resting period.  Outcome: Progressing     Problem: ABCDS Injury Assessment  Goal: Absence of physical injury  Outcome: Progressing

## 2024-09-03 NOTE — DISCHARGE INSTR - COC
Continuity of Care Form    Patient Name: Shanna Mejia   :  1955  MRN:  [id number]    Admit date:  2024  Discharge date:  2024    Code Status Order: Full Code   Advance Directives:   Advance Care Flowsheet Documentation             Admitting Physician:  Sona Thomas MD  PCP: Sona Thomas MD    Discharging Nurse: Jovanna RN  Discharging Hospital Unit/Room#: O6O-3906/4129-01  Discharging Unit Phone Number: [phone]    Emergency Contact:   Extended Emergency Contact Information  Primary Emergency Contact: Christen Lima   [facility]  Home Phone: [phone]  Relation: Child  Secondary Emergency Contact: Jeffery Mejia  Home Phone: [phone]  Mobile Phone: [phone]  Relation: Ex-Spouse    Past Surgical History:  Past Surgical History:   Procedure Laterality Date    CARDIAC CATHETERIZATION      CARDIAC PROCEDURE N/A 2024    Peripheral angiography performed by Maximo Boo MD at [facility] CARDIAC CATH LAB    CAROTID ENDARTERECTOMY Left     2017    CATARACT REMOVAL Right 2022    CORONARY ANGIOPLASTY WITH STENT PLACEMENT  03/2019    X 1    CYST INCISION AND DRAINAGE      right arm    DIALYSIS FISTULA CREATION Left 2024    LEFT ARM ARTERIOVENOUS FISTULA CREATION performed by Dennis Sotomayor II, MD at [facility] OR    HYSTERECTOMY (CERVIX STATUS UNKNOWN)      INTRACAPSULAR CATARACT EXTRACTION Left 2020    PHACOEMULSIFICATION WITH INTRAOCULAR LENS IMPLANT performed by Akira Reza MD at [facility] MOB SURG CTR    INTRACAPSULAR CATARACT EXTRACTION Right 2022    PHACOEMULSIFICATION WITH INTRAOCULAR LENS IMPLANT - RIGHT EYE performed by Akira Reza MD at [facility] MOB SURG CTR    INVASIVE VASCULAR N/A 2024    Aortagram abdominal w runoff performed by Remberto Thurston MD at [facility] CARDIAC CATH LAB    IR TUNNELED CATHETER PLACEMENT GREATER THAN 5 YEARS Right 2024    Permacath; RIJ access; 23cm; Dr. Palencia    IR TUNNELED CATHETER PLACEMENT GREATER  bedrest  SpO2 92%   BMI 19.34 kg/m²     Last documented pain score (0-10 scale): Pain Level: 0  Last Weight:   Wt Readings from Last 1 Encounters:   09/03/24 57.7 kg (127 lb 3.3 oz)     Mental Status:  oriented and alert    IV Access:  Dialysis catheter- Right neck- insertion [facility]-3/    Nursing Mobility/ADLs:  Walking   Dependent  Transfer  Assisted  Bathing  Assisted  Dressing  Assisted  Toileting  Dependent  Feeding  Assisted  Med Admin  Assisted  Med Delivery   whole    Wound Care Documentation and Therapy:  Incision 07/11/24 Radial Right (Active)   Number of days: 54        Elimination:  Continence:   Bowel: Yes  Bladder: Yes  Urinary Catheter: None   Colostomy/Ileostomy/Ileal Conduit: No       Date of Last BM: 9/6/2024  No intake or output data in the 24 hours ending 09/03/24 1506  No intake/output data recorded.    Safety Concerns:     At Risk for Falls    Impairments/Disabilities:      None    Nutrition Therapy:  Current Nutrition Therapy:   - Oral Diet:  General    Routes of Feeding: Oral  Liquids: No Restrictions  Daily Fluid Restriction: yes - amount 1200ml      Treatments at the Time of Hospital Discharge:   Respiratory Treatments: Yes  Oxygen Therapy:  is not on home oxygen therapy.  Ventilator:    - No ventilator support    Rehab Therapies: Physical Therapy and Occupational Therapy  Weight Bearing Status/Restrictions: No weight bearing restrictions  Other Medical Equipment (for information only, NOT a DME order):  bedside commode        RN SIGNATURE:  Electronically signed by Jovanna Price RN on 9/7/24 at 10:45 AM EDT    CASE MANAGEMENT/SOCIAL WORK SECTION    Inpatient Status Date: 9/2/24    Readmission Risk Assessment Score:  Readmission Risk              Risk of Unplanned Readmission:  23           Discharging to Facility/ Agency   Name: Discharging to Facility/ Agency   Discharging to Facility/ Agency   Name: [facility]  Address:  [address], [facility] [address]   Phone:

## 2024-09-03 NOTE — H&P
Hospital Medicine History & Physical    Patient:  Shanna Mejia  YOB: 1955  Date of Service:9/3/24  MRN: [id number]    PCP: Sona Thomas MD    Date of Admission: 9/2/2024      Chief Complaint:    Chief Complaint   Patient presents with    Leg Swelling     Right foot ankle swelling and pain/chronic wound to right shin.  Patient states she was seeing wound care for wound.           History Of Present Illness:    The patient is a 68 y.o. female who presents to [facility] with c/o as above  Diagnosed with possible gangrene of the L great toe  Still is in pain  Denies any other complaints    Past Medical History:        Diagnosis Date    Carotid artery stenosis     CHF (congestive heart failure) (HCC)     CKD (chronic kidney disease)     Coronary artery disease     Diabetes mellitus (HCC)     on no meds -[id number]    Dilated cardiomyopathy (HCC)     Gout     Hemodialysis patient (HCC)     Hyperlipidemia     Hypertension     Hypothyroidism     Ischemic stroke (HCC)     2016    Lung nodule     Neuropathy     Osteopenia     Peripheral arterial disease (HCC)     Peripheral neuropathy     Tobacco use disorder        Past Surgical History:        Procedure Laterality Date    CARDIAC CATHETERIZATION      CARDIAC PROCEDURE N/A 7/26/2024    Peripheral angiography performed by Maximo Boo MD at [facility] CARDIAC CATH LAB    CAROTID ENDARTERECTOMY Left     2017    CATARACT REMOVAL Right 05/2022    CORONARY ANGIOPLASTY WITH STENT PLACEMENT  03/2019    X 1    CYST INCISION AND DRAINAGE      right arm    DIALYSIS FISTULA CREATION Left 7/11/2024    LEFT ARM ARTERIOVENOUS FISTULA CREATION performed by Dennis Sotomayor II, MD at [facility] OR    HYSTERECTOMY (CERVIX STATUS UNKNOWN)      INTRACAPSULAR CATARACT EXTRACTION Left 07/30/2020    PHACOEMULSIFICATION WITH            ASSESSMENT/PLAN:  Gangrene- dr faye to see the pt for possible re vascularization  PAD- severe  Esrd-on HD  Chf-stable  Htn-stable    Pt is stable.  Discussed with staff and pt about the plan.  See the orders for further plan.  Will proceed further acc to pt's progress.    Electronically signed by Sona Thomas MD on 9/3/2024 at 7:57 AM

## 2024-09-03 NOTE — CONSULTS
[facility]   CONSULTATION        Chief Complaint   Patient presents with    Leg Swelling     Right foot ankle swelling and pain/chronic wound to right shin.  Patient states she was seeing wound care for wound.              History of Present Illness:  Shanna Mejia is a 68 y.o. patient who presented to the hospital with complaints of leg pain. I have been asked to provide consultation regarding further management and testing.    Patient known to me, very pleasant 68-year-old female with a past medical history of end-stage renal disease severe peripheral arterial disease, diabetes.  Underwent recent angiography with my partner Dr. Thurston who follows her for her PAD.    On examination her right leg has a pretty significant what appears to be ischemic wound.    Past Medical History:   has a past medical history of Carotid artery stenosis, CHF (congestive heart failure) (HCC), CKD (chronic kidney disease), Coronary artery disease, Diabetes mellitus (HCC), Dilated cardiomyopathy (HCC), Gout, Hemodialysis patient (HCC), Hyperlipidemia, Hypertension, Hypothyroidism, Ischemic stroke (HCC), Lung nodule, Neuropathy, Osteopenia, Peripheral arterial disease (HCC), Peripheral neuropathy, and Tobacco use disorder.    Surgical History:   has a past surgical history that includes Carotid endarterectomy (Left); Tubal ligation; Hysterectomy; Breast cyst incision and drainage; vitrectomy (Left, 02/18/2019); Refractive surgery (Left, 02/18/2019); Coronary angioplasty with stent (03/2019); Cardiac catheterization; Intracapsular cataract extraction (Left, 07/30/2020); Intracapsular cataract extraction (Right, 05/13/2022); Cataract removal (Right, 05/2022); IR TUNNELED CVC PLACE WO SQ PORT/PUMP > 5 YEARS (Right, 03/13/2024); IR TUNNELED CVC PLACE WO SQ PORT/PUMP > 5 YEARS (3/13/2024); Dialysis fistula creation (Left, 7/11/2024); Cardiac procedure (N/A, 7/26/2024); and invasive vascular (N/A, 7/30/2024).     Social History:    (DULCOLAX) 5 MG EC tablet Take 1 tablet by mouth daily as needed for Constipation   Yes Erika Harris MD   acetaminophen (TYLENOL) 500 MG tablet Take 1 tablet by mouth every 6 hours as needed for Pain   Yes Erika Harris MD   fluticasone (FLONASE) 50 MCG/ACT nasal spray 1 spray by Each Nostril route daily as needed for Rhinitis or Allergies   Yes Erika Harris MD   aspirin 81 MG tablet Take 1 tablet by mouth daily   Yes Erika Harris MD   oxyCODONE-acetaminophen (PERCOCET) 5-325 MG per tablet Take 1 tablet by mouth every 6 hours as needed for Pain for up to 14 days. Intended supply: 3 days. Take lowest dose possible to manage pain Max Daily Amount: 4 tablets  Patient not taking: Reported on 9/3/2024 8/29/24 9/12/24  Sona Thomas MD   rosuvastatin (CRESTOR) 40 MG tablet Take one tablet by mouth daily  Patient not taking: Reported on 9/3/2024 1/10/24   Sona Thomas MD   tiotropium (SPIRIVA RESPIMAT) 2.5 MCG/ACT AERS inhaler Inhale 2 puffs into the lungs daily  Patient not taking: Reported on 9/3/2024    Erika Harris MD   albuterol sulfate  (90 Base) MCG/ACT inhaler Inhale 2 puffs into the lungs every 4 hours as needed for Wheezing or Shortness of Breath 7/16/21   Erika Harris MD        Current Medications:  Current Facility-Administered Medications   Medication Dose Route Frequency Provider Last Rate Last Admin    piperacillin-tazobactam (ZOSYN) 3,375 mg in sodium chloride 0.9 % 50 mL IVPB (mini-bag)  3,375 mg IntraVENous Q12H Sona Thomas MD        [START ON 9/4/2024] heparin (porcine) injection 1,000 Units  1,000 Units IntraVENous Q MWF Jaspreet Farfan MD        [START ON 9/4/2024] calcitRIOL (ROCALTROL) capsule 2.5 mcg  2.5 mcg Oral Once per day on Monday Wednesday Friday Jaspreet Farfan MD        [START ON 9/4/2024] levothyroxine (SYNTHROID) tablet 225 mcg  225 mcg Oral Daily Jeffery Wills MD        atorvastatin (LIPITOR) tablet 40 mg

## 2024-09-04 LAB
ANION GAP SERPL CALCULATED.3IONS-SCNC: 9 MMOL/L (ref 3–16)
ANISOCYTOSIS BLD QL SMEAR: ABNORMAL
BASOPHILS # BLD: 0 K/UL (ref 0–0.2)
BASOPHILS NFR BLD: 0 %
BUN SERPL-MCNC: 27 MG/DL (ref 7–20)
CALCIUM SERPL-MCNC: 8.4 MG/DL (ref 8.3–10.6)
CHLORIDE SERPL-SCNC: 98 MMOL/L (ref 99–110)
CO2 SERPL-SCNC: 28 MMOL/L (ref 21–32)
CREAT SERPL-MCNC: 2.5 MG/DL (ref 0.6–1.2)
DEPRECATED RDW RBC AUTO: 20.8 % (ref 12.4–15.4)
EOSINOPHIL # BLD: 0.2 K/UL (ref 0–0.6)
EOSINOPHIL NFR BLD: 3 %
GFR SERPLBLD CREATININE-BSD FMLA CKD-EPI: 20 ML/MIN/{1.73_M2}
GLUCOSE SERPL-MCNC: 92 MG/DL (ref 70–99)
HCT VFR BLD AUTO: 33.9 % (ref 36–48)
HGB BLD-MCNC: 11 G/DL (ref 12–16)
LYMPHOCYTES # BLD: 0.9 K/UL (ref 1–5.1)
LYMPHOCYTES NFR BLD: 11 %
MCH RBC QN AUTO: 26.4 PG (ref 26–34)
MCHC RBC AUTO-ENTMCNC: 32.4 G/DL (ref 31–36)
MCV RBC AUTO: 81.4 FL (ref 80–100)
MICROCYTES BLD QL SMEAR: ABNORMAL
MONOCYTES # BLD: 0.5 K/UL (ref 0–1.3)
MONOCYTES NFR BLD: 7 %
NEUTROPHILS # BLD: 6.2 K/UL (ref 1.7–7.7)
NEUTROPHILS NFR BLD: 79 %
OVALOCYTES BLD QL SMEAR: ABNORMAL
PLATELET # BLD AUTO: 281 K/UL (ref 135–450)
PMV BLD AUTO: 7.2 FL (ref 5–10.5)
POC ACT LR: 287 SEC
POIKILOCYTOSIS BLD QL SMEAR: ABNORMAL
POTASSIUM SERPL-SCNC: 3.9 MMOL/L (ref 3.5–5.1)
RBC # BLD AUTO: 4.17 M/UL (ref 4–5.2)
SODIUM SERPL-SCNC: 135 MMOL/L (ref 136–145)
TARGETS BLD QL SMEAR: ABNORMAL
WBC # BLD AUTO: 7.8 K/UL (ref 4–11)

## 2024-09-04 PROCEDURE — 7100000010 HC PHASE II RECOVERY - FIRST 15 MIN: Performed by: STUDENT IN AN ORGANIZED HEALTH CARE EDUCATION/TRAINING PROGRAM

## 2024-09-04 PROCEDURE — 6370000000 HC RX 637 (ALT 250 FOR IP): Performed by: STUDENT IN AN ORGANIZED HEALTH CARE EDUCATION/TRAINING PROGRAM

## 2024-09-04 PROCEDURE — 2580000003 HC RX 258: Performed by: INTERNAL MEDICINE

## 2024-09-04 PROCEDURE — 75710 ARTERY X-RAYS ARM/LEG: CPT | Performed by: STUDENT IN AN ORGANIZED HEALTH CARE EDUCATION/TRAINING PROGRAM

## 2024-09-04 PROCEDURE — 99152 MOD SED SAME PHYS/QHP 5/>YRS: CPT | Performed by: STUDENT IN AN ORGANIZED HEALTH CARE EDUCATION/TRAINING PROGRAM

## 2024-09-04 PROCEDURE — 75716 ARTERY X-RAYS ARMS/LEGS: CPT | Performed by: STUDENT IN AN ORGANIZED HEALTH CARE EDUCATION/TRAINING PROGRAM

## 2024-09-04 PROCEDURE — 5A1D70Z PERFORMANCE OF URINARY FILTRATION, INTERMITTENT, LESS THAN 6 HOURS PER DAY: ICD-10-PCS | Performed by: SPECIALIST

## 2024-09-04 PROCEDURE — 85347 COAGULATION TIME ACTIVATED: CPT

## 2024-09-04 PROCEDURE — 04FC3ZZ FRAGMENTATION OF RIGHT COMMON ILIAC ARTERY, PERCUTANEOUS APPROACH: ICD-10-PCS | Performed by: STUDENT IN AN ORGANIZED HEALTH CARE EDUCATION/TRAINING PROGRAM

## 2024-09-04 PROCEDURE — 6360000002 HC RX W HCPCS: Performed by: SPECIALIST

## 2024-09-04 PROCEDURE — 6360000004 HC RX CONTRAST MEDICATION: Performed by: STUDENT IN AN ORGANIZED HEALTH CARE EDUCATION/TRAINING PROGRAM

## 2024-09-04 PROCEDURE — 99233 SBSQ HOSP IP/OBS HIGH 50: CPT | Performed by: STUDENT IN AN ORGANIZED HEALTH CARE EDUCATION/TRAINING PROGRAM

## 2024-09-04 PROCEDURE — 6360000002 HC RX W HCPCS: Performed by: STUDENT IN AN ORGANIZED HEALTH CARE EDUCATION/TRAINING PROGRAM

## 2024-09-04 PROCEDURE — 90935 HEMODIALYSIS ONE EVALUATION: CPT

## 2024-09-04 PROCEDURE — 75625 CONTRAST EXAM ABDOMINL AORTA: CPT | Performed by: STUDENT IN AN ORGANIZED HEALTH CARE EDUCATION/TRAINING PROGRAM

## 2024-09-04 PROCEDURE — 37222 PR REVASCULARIZATION ILIAC ART ANGIOP EA IPSI VSL: CPT | Performed by: STUDENT IN AN ORGANIZED HEALTH CARE EDUCATION/TRAINING PROGRAM

## 2024-09-04 PROCEDURE — 2580000003 HC RX 258: Performed by: STUDENT IN AN ORGANIZED HEALTH CARE EDUCATION/TRAINING PROGRAM

## 2024-09-04 PROCEDURE — C1769 GUIDE WIRE: HCPCS | Performed by: STUDENT IN AN ORGANIZED HEALTH CARE EDUCATION/TRAINING PROGRAM

## 2024-09-04 PROCEDURE — B41C1ZZ FLUOROSCOPY OF PELVIC ARTERIES USING LOW OSMOLAR CONTRAST: ICD-10-PCS | Performed by: STUDENT IN AN ORGANIZED HEALTH CARE EDUCATION/TRAINING PROGRAM

## 2024-09-04 PROCEDURE — 1200000000 HC SEMI PRIVATE

## 2024-09-04 PROCEDURE — 6360000002 HC RX W HCPCS: Performed by: INTERNAL MEDICINE

## 2024-09-04 PROCEDURE — 2709999900 HC NON-CHARGEABLE SUPPLY: Performed by: STUDENT IN AN ORGANIZED HEALTH CARE EDUCATION/TRAINING PROGRAM

## 2024-09-04 PROCEDURE — 6370000000 HC RX 637 (ALT 250 FOR IP): Performed by: SPECIALIST

## 2024-09-04 PROCEDURE — C1725 CATH, TRANSLUMIN NON-LASER: HCPCS | Performed by: STUDENT IN AN ORGANIZED HEALTH CARE EDUCATION/TRAINING PROGRAM

## 2024-09-04 PROCEDURE — 94640 AIRWAY INHALATION TREATMENT: CPT

## 2024-09-04 PROCEDURE — 2500000003 HC RX 250 WO HCPCS: Performed by: STUDENT IN AN ORGANIZED HEALTH CARE EDUCATION/TRAINING PROGRAM

## 2024-09-04 PROCEDURE — 37220 HC ILIAC TERRITORY PLASTY: CPT | Performed by: STUDENT IN AN ORGANIZED HEALTH CARE EDUCATION/TRAINING PROGRAM

## 2024-09-04 PROCEDURE — C1894 INTRO/SHEATH, NON-LASER: HCPCS | Performed by: STUDENT IN AN ORGANIZED HEALTH CARE EDUCATION/TRAINING PROGRAM

## 2024-09-04 PROCEDURE — 047C3ZZ DILATION OF RIGHT COMMON ILIAC ARTERY, PERCUTANEOUS APPROACH: ICD-10-PCS | Performed by: STUDENT IN AN ORGANIZED HEALTH CARE EDUCATION/TRAINING PROGRAM

## 2024-09-04 PROCEDURE — 99153 MOD SED SAME PHYS/QHP EA: CPT | Performed by: STUDENT IN AN ORGANIZED HEALTH CARE EDUCATION/TRAINING PROGRAM

## 2024-09-04 PROCEDURE — 80048 BASIC METABOLIC PNL TOTAL CA: CPT

## 2024-09-04 PROCEDURE — 85025 COMPLETE CBC W/AUTO DIFF WBC: CPT

## 2024-09-04 PROCEDURE — B4101ZZ FLUOROSCOPY OF ABDOMINAL AORTA USING LOW OSMOLAR CONTRAST: ICD-10-PCS | Performed by: STUDENT IN AN ORGANIZED HEALTH CARE EDUCATION/TRAINING PROGRAM

## 2024-09-04 PROCEDURE — 7100000011 HC PHASE II RECOVERY - ADDTL 15 MIN: Performed by: STUDENT IN AN ORGANIZED HEALTH CARE EDUCATION/TRAINING PROGRAM

## 2024-09-04 PROCEDURE — 37220 PR REVASCULARIZATION ILIAC ARTERY ANGIOP 1ST VSL: CPT | Performed by: STUDENT IN AN ORGANIZED HEALTH CARE EDUCATION/TRAINING PROGRAM

## 2024-09-04 PROCEDURE — B41F1ZZ FLUOROSCOPY OF RIGHT LOWER EXTREMITY ARTERIES USING LOW OSMOLAR CONTRAST: ICD-10-PCS | Performed by: STUDENT IN AN ORGANIZED HEALTH CARE EDUCATION/TRAINING PROGRAM

## 2024-09-04 PROCEDURE — 37222 HC ILIAC TERRITORY ADDL PLASTY: CPT | Performed by: STUDENT IN AN ORGANIZED HEALTH CARE EDUCATION/TRAINING PROGRAM

## 2024-09-04 RX ORDER — IOPAMIDOL 755 MG/ML
INJECTION, SOLUTION INTRAVASCULAR PRN
Status: DISCONTINUED | OUTPATIENT
Start: 2024-09-04 | End: 2024-09-04 | Stop reason: HOSPADM

## 2024-09-04 RX ORDER — MIDAZOLAM HYDROCHLORIDE 1 MG/ML
INJECTION INTRAMUSCULAR; INTRAVENOUS PRN
Status: DISCONTINUED | OUTPATIENT
Start: 2024-09-04 | End: 2024-09-04 | Stop reason: HOSPADM

## 2024-09-04 RX ORDER — LIDOCAINE HYDROCHLORIDE 10 MG/ML
INJECTION, SOLUTION INFILTRATION; PERINEURAL PRN
Status: DISCONTINUED | OUTPATIENT
Start: 2024-09-04 | End: 2024-09-04 | Stop reason: HOSPADM

## 2024-09-04 RX ORDER — HEPARIN SODIUM 1000 [USP'U]/ML
3600 INJECTION, SOLUTION INTRAVENOUS; SUBCUTANEOUS PRN
Status: DISCONTINUED | OUTPATIENT
Start: 2024-09-04 | End: 2024-09-07 | Stop reason: HOSPADM

## 2024-09-04 RX ORDER — CALCITRIOL 0.25 UG/1
CAPSULE, LIQUID FILLED ORAL
Status: DISCONTINUED
Start: 2024-09-04 | End: 2024-09-04 | Stop reason: WASHOUT

## 2024-09-04 RX ORDER — PROTAMINE SULFATE 10 MG/ML
INJECTION, SOLUTION INTRAVENOUS PRN
Status: DISCONTINUED | OUTPATIENT
Start: 2024-09-04 | End: 2024-09-04 | Stop reason: HOSPADM

## 2024-09-04 RX ORDER — HEPARIN SODIUM 1000 [USP'U]/ML
INJECTION, SOLUTION INTRAVENOUS; SUBCUTANEOUS PRN
Status: DISCONTINUED | OUTPATIENT
Start: 2024-09-04 | End: 2024-09-04 | Stop reason: HOSPADM

## 2024-09-04 RX ORDER — SODIUM CHLORIDE 9 MG/ML
INJECTION, SOLUTION INTRAVENOUS CONTINUOUS PRN
Status: COMPLETED | OUTPATIENT
Start: 2024-09-04 | End: 2024-09-04

## 2024-09-04 RX ADMIN — PIPERACILLIN AND TAZOBACTAM 3375 MG: 3; .375 INJECTION, POWDER, LYOPHILIZED, FOR SOLUTION INTRAVENOUS at 18:08

## 2024-09-04 RX ADMIN — OXYCODONE HYDROCHLORIDE AND ACETAMINOPHEN 1 TABLET: 5; 325 TABLET ORAL at 16:29

## 2024-09-04 RX ADMIN — ISOSORBIDE MONONITRATE 30 MG: 30 TABLET, EXTENDED RELEASE ORAL at 16:29

## 2024-09-04 RX ADMIN — LINEZOLID 600 MG: 600 INJECTION, SOLUTION INTRAVENOUS at 03:41

## 2024-09-04 RX ADMIN — LINEZOLID 600 MG: 600 INJECTION, SOLUTION INTRAVENOUS at 16:38

## 2024-09-04 RX ADMIN — SEVELAMER CARBONATE 800 MG: 800 TABLET, FILM COATED ORAL at 16:29

## 2024-09-04 RX ADMIN — PIPERACILLIN AND TAZOBACTAM 3375 MG: 3; .375 INJECTION, POWDER, LYOPHILIZED, FOR SOLUTION INTRAVENOUS at 05:48

## 2024-09-04 RX ADMIN — ATORVASTATIN CALCIUM 40 MG: 40 TABLET, FILM COATED ORAL at 20:27

## 2024-09-04 RX ADMIN — HEPARIN SODIUM 3600 UNITS: 1000 INJECTION INTRAVENOUS; SUBCUTANEOUS at 12:00

## 2024-09-04 RX ADMIN — OXYCODONE HYDROCHLORIDE AND ACETAMINOPHEN 1 TABLET: 5; 325 TABLET ORAL at 06:50

## 2024-09-04 RX ADMIN — CALCITRIOL 2.5 MCG: 0.25 CAPSULE ORAL at 20:27

## 2024-09-04 RX ADMIN — ASPIRIN 81 MG: 81 TABLET, CHEWABLE ORAL at 16:29

## 2024-09-04 RX ADMIN — CARVEDILOL 6.25 MG: 6.25 TABLET, FILM COATED ORAL at 16:29

## 2024-09-04 RX ADMIN — BUDESONIDE AND FORMOTEROL FUMARATE DIHYDRATE 2 PUFF: 160; 4.5 AEROSOL RESPIRATORY (INHALATION) at 20:25

## 2024-09-04 RX ADMIN — CLOPIDOGREL BISULFATE 75 MG: 75 TABLET ORAL at 16:29

## 2024-09-04 ASSESSMENT — PAIN SCALES - GENERAL
PAINLEVEL_OUTOF10: 4
PAINLEVEL_OUTOF10: 4
PAINLEVEL_OUTOF10: 0
PAINLEVEL_OUTOF10: 8
PAINLEVEL_OUTOF10: 9

## 2024-09-04 ASSESSMENT — PAIN DESCRIPTION - ORIENTATION: ORIENTATION: RIGHT

## 2024-09-04 ASSESSMENT — PAIN DESCRIPTION - LOCATION: LOCATION: LEG

## 2024-09-04 NOTE — BRIEF OP NOTE
Brief Postoperative Note      Patient: Shanna Mejia  YOB: 1955  MRN: [id number]    Date of Procedure: 9/4/2024    Pre-Op Diagnosis Codes:      * PAD (peripheral artery disease) ([facility]) [I73.9]    Post-Op Diagnosis: Same       Procedure(s):  Peripheral angiography  Angioplasty iliac  Intravascular lithotripsy    Surgeon(s):  Armaan Mandujano MD    Estimated Blood Loss (mL): less than 50   Complications: None      Findings:  The patient was taken to the cardiac cath lab and placed in supine position.  IV sedation anesthesia was administered by the anesthesia team. The operative area was clipped, prepared and draped in sterile fashion.  A brief time out was performed per hospital protocol.    The left femoral artery was accessed under fluroscopic and ultrasound guidance with a micropuncture needle, wire, then sheath. A 4-Ecuadorean sheath was inserted over a wire. An iliofemoral angiogram was performed.  Using a pig tail catheter positioned at the level of the renal arteries, a aortoiliac arteriogram was performed.     Renal arteries: patent  Abdominal Aorta:  marked calcification.       Right Common Iliac:  30%  Stenosis  Right Internal Iliac:  is  patent  Right External Iliac:  is  patent  Left Common Iliac:  90% stenosis  Left Internal Iliac:  is  patent  Left External Iliac:  is  patent    Right Leg  Common Femoral:  moderate 60% stenosis  Profunda: patent without significant disease  Superficial femoral: severe disease proximally extending into a mid vessel   Popliteal: severe disease proximal  that reconstitutes prior to PT trunk    - Percutaneous balloon angioplasty of right iliac artery using a 7.0 mm x 60 mm balloon   - Percutaneous balloon angioplasty of right iliac artery using a 7.0 mm x 80 mm shockwave balloon, intravascular lithotripsy performed   - Percutaneous balloon angioplasty of right iliac artery using a 8.0 mm x 60 mm balloon     A completion arteriogram was

## 2024-09-04 NOTE — PLAN OF CARE
Problem: Pain  Goal: Verbalizes/displays adequate comfort level or baseline comfort level  9/3/2024 2149 by Susan Collins RN  Outcome: Progressing  Flowsheets (Taken 9/3/2024 2149)  Verbalizes/displays adequate comfort level or baseline comfort level:   Encourage patient to monitor pain and request assistance   Assess pain using appropriate pain scale   Administer analgesics based on type and severity of pain and evaluate response   Implement non-pharmacological measures as appropriate and evaluate response     Problem: Discharge Planning  Goal: Discharge to home or other facility with appropriate resources  9/3/2024 2149 by Susan Collins RN  Outcome: Progressing  Flowsheets (Taken 9/3/2024 2149)  Discharge to home or other facility with appropriate resources:   Identify barriers to discharge with patient and caregiver   Arrange for needed discharge resources and transportation as appropriate     Problem: Safety - Adult  Goal: Free from fall injury  9/3/2024 2149 by Susan Collins RN  Outcome: Progressing     Problem: Skin/Tissue Integrity  Goal: Absence of new skin breakdown  Description: 1.  Monitor for areas of redness and/or skin breakdown  2.  Assess vascular access sites hourly  3.  Every 4-6 hours minimum:  Change oxygen saturation probe site  4.  Every 4-6 hours:  If on nasal continuous positive airway pressure, respiratory therapy assess nares and determine need for appliance change or resting period.  9/3/2024 2149 by Susan Collins RN  Outcome: Progressing     Problem: ABCDS Injury Assessment  Goal: Absence of physical injury  9/3/2024 2149 by Susan Collins RN  Outcome: Progressing  Flowsheets (Taken 9/3/2024 2149)  Absence of Physical Injury: Implement safety measures based on patient assessment

## 2024-09-04 NOTE — PROGRESS NOTES
[facility]   CONSULTATION        Chief Complaint   Patient presents with    Leg Swelling     Right foot ankle swelling and pain/chronic wound to right shin.  Patient states she was seeing wound care for wound.        INTERVAL HISTORY  Doing well  Peripheral angiography today    Past Medical History:   has a past medical history of Carotid artery stenosis, CHF (congestive heart failure) (HCC), CKD (chronic kidney disease), Coronary artery disease, Diabetes mellitus (HCC), Dilated cardiomyopathy (HCC), Gout, Hemodialysis patient (HCC), Hyperlipidemia, Hypertension, Hypothyroidism, Ischemic stroke (HCC), Lung nodule, Neuropathy, Osteopenia, Peripheral arterial disease (HCC), Peripheral neuropathy, and Tobacco use disorder.    Surgical History:   has a past surgical history that includes Carotid endarterectomy (Left); Tubal ligation; Hysterectomy; Breast cyst incision and drainage; vitrectomy (Left, 02/18/2019); Refractive surgery (Left, 02/18/2019); Coronary angioplasty with stent (03/2019); Cardiac catheterization; Intracapsular cataract extraction (Left, 07/30/2020); Intracapsular cataract extraction (Right, 05/13/2022); Cataract removal (Right, 05/2022); IR TUNNELED CVC PLACE WO SQ PORT/PUMP > 5 YEARS (Right, 03/13/2024); IR TUNNELED CVC PLACE WO SQ PORT/PUMP > 5 YEARS (3/13/2024); Dialysis fistula creation (Left, 7/11/2024); Cardiac procedure (N/A, 7/26/2024); and invasive vascular (N/A, 7/30/2024).     Social History:   reports that she has been smoking cigarettes. She has a 23.5 pack-year smoking history. She has never used smokeless tobacco. She reports that she does not drink alcohol and does not use drugs.     Family History:  No family history of premature coronary artery disease, aortic disease, or valve disease.    Home Medications:  Were reviewed and are listed in nursing record. and/or listed below  Prior to Admission medications    Medication Sig Start Date End Date Taking? Authorizing

## 2024-09-04 NOTE — PROGRESS NOTES
Inpatient Note    CC: right foot gangrene  Summary:   Shanna Mejia is being seen by nephrology for ESRD. She is a 68 y.o. female with a PMH significant for CHF, HTN, peripheral vascular disease, active smoker, T2DM who presented to the ED 9/2/2024 with worsening right foot gangrenous ulcers and pain. She is admitted for further management.    Interval History  Seen on dialysis  /70  On room air  Labs reviewed.  Hb 11.0  No acute electrolyte abnormalities.    Plan:   - HD today per MWF schedule.  - UF to EDW.  - holding LACI for now since Hb at goal.  - BP at goal with home meds.  - planned for LE angiogram today    Assessment:   ESRD:  - on HD MWF at [facility]  - Access: TDC  - EDW 56 kg    Hypertension:  - home regimen: carvedilol 6.25 mg BID, imdur 30 mg daily  - LVEF 40% (10/2023)    SHPTH:  - gets calcitriol 2.5 mcg qHD as outpatient. Continue  - renvela for hyperphosphatemia    PVD:  - chronic gangrenous wounds to RLE with acute worsening  - per podiatry and vascular surgery.      Jaspreet Farfan MD  [facility]  Office: [phone]          PE:   Vitals:    09/04/24 0809   BP: 116/70   Pulse: 66   Resp: 18   Temp: 98 °F (36.7 °C)   SpO2:        General appearance: in NAD  Respiratory: Respiratory effort appears normal, bilateral equal chest rise, no wheeze, no crackles  Cardiovascular: Ausculation shows RRR + edema  Abdomen: No visible mass or tenderness, non distended.

## 2024-09-04 NOTE — PROGRESS NOTES
Occupational Therapy Attempt  Shanna Mejia    Attempted OT treatment this AM. Pt currently at dialysis. Will attempt again as schedule permits.     Second attempt - pt off floor at cath lab this PM. Will re-attempt next date.    Electronically signed by Christine Wallis OT on 9/4/24 at 10:50 AM EDT

## 2024-09-04 NOTE — PLAN OF CARE
Problem: Discharge Planning  Goal: Discharge to home or other facility with appropriate resources  9/4/2024 0754 by Mita Euceda RN  Outcome: Progressing  9/3/2024 2149 by Susan Collins RN  Outcome: Progressing  Flowsheets (Taken 9/3/2024 2149)  Discharge to home or other facility with appropriate resources:   Identify barriers to discharge with patient and caregiver   Arrange for needed discharge resources and transportation as appropriate     Problem: Pain  Goal: Verbalizes/displays adequate comfort level or baseline comfort level  9/4/2024 0754 by Mita Euceda RN  Outcome: Progressing  9/3/2024 2149 by Susan Collins RN  Outcome: Progressing  Flowsheets (Taken 9/3/2024 2149)  Verbalizes/displays adequate comfort level or baseline comfort level:   Encourage patient to monitor pain and request assistance   Assess pain using appropriate pain scale   Administer analgesics based on type and severity of pain and evaluate response   Implement non-pharmacological measures as appropriate and evaluate response     Problem: Safety - Adult  Goal: Free from fall injury  9/4/2024 0754 by Mita Euceda RN  Outcome: Progressing  9/3/2024 2149 by Susan Collins RN  Outcome: Progressing     Problem: Skin/Tissue Integrity  Goal: Absence of new skin breakdown  Description: 1.  Monitor for areas of redness and/or skin breakdown  2.  Assess vascular access sites hourly  3.  Every 4-6 hours minimum:  Change oxygen saturation probe site  4.  Every 4-6 hours:  If on nasal continuous positive airway pressure, respiratory therapy assess nares and determine need for appliance change or resting period.  9/4/2024 0754 by Mita Euceda RN  Outcome: Progressing  9/3/2024 2149 by Susan Collins RN  Outcome: Progressing     Problem: ABCDS Injury Assessment  Goal: Absence of physical injury  9/4/2024 0754 by Mita Euceda RN  Outcome: Progressing  9/3/2024 2149 by Susan Collins RN  Outcome: Progressing  Flowsheets (Taken

## 2024-09-04 NOTE — PROGRESS NOTES
Physical Therapy  PT attempt  Shanna Mejia    Attempted PT treatment this AM.  Pt currently at dialysis.  Will attempt again as schedule permits.    Electronically signed by Danyelle Head, PT [id number] on 9/4/2024 at 10:28 AM

## 2024-09-04 NOTE — PROGRESS NOTES
Treatment time: 3.5 hrs    Net UF: 2200 ml     Pre weight: 58.2 kg  Post weight: 56.0 kg     Access used: Rtdc  Access function:  tolerated well,  [id number]     Medications or blood products given: heparin     Regular outpatient schedule: MWF     Summary of response to treatment: Pt tolerated well. Pt remained stable throughout entire treatment and upon exiting the hemodialysis suite.      Copy of dialysis treatment record placed in chart, to be scanned into EMR.

## 2024-09-04 NOTE — PROGRESS NOTES
1330 Arrived to Pre/post from procedural area, arouses to name.  Sheath intact to left groin.  Site unremarkable.  1347  ACT drawn   ACT results 177  1400  Sheath pulled from left common femoral artery. Direct pressure applied.  1425  Hemostasis obtained after holding pressure for 20 minutes.  Pressure dressing applied to left gron  1550  Report called to CAR Fan on floor.  1605  Feet elevated off bed, placed on pillow.  Transferred to 4129  via bed.  Left groin remains soft, pressure dressing intact, to be removed on arrival to floor.

## 2024-09-04 NOTE — DISCHARGE INSTRUCTIONS
PERIPHERAL ANGIOGRAM    Care of your puncture site:  Remove bandage 24 hours after the procedure.  May shower in 24 hours but do not sit in a bathtub/pool of water for 5 days or until the wound is healed.  Gently clean groin using soap and water.  Dry thoroughly and apply a Band-Aid that covers the entire site. Use Band-Aid until skin heals over in about 3-5 days.  Do not apply powder or lotion.      Normal Observations:  Soreness or tenderness which may last one week.  Mild oozing from the incision site.  Possible bruising that could last 2 weeks.    Activity:  You may resume driving 24 hours following the procedure.  Do not make important / legal decisions within 24 hours after procedure.  You may resume normal activity in 5 days or after the wound heals.  Avoid lifting more than 10 pounds for 5 days or until the wound heals.  Avoid strenuous exercise or activity for 1 week.  Regular diet .  Drink at least 8 to 10 glasses of decaffeinated, non-alcoholic fluid for the next 24 hours to flush the x-ray dye used for your angiogram out of your body.    Call your doctor immediately if your condition worsens, for any other concerns, for a follow-up appointment or if you experience any of the following:  Increased swelling on the groin or leg.  Unusual pain, numbness, or tingling of the groin or down the leg.  Any signs of infection such as: redness, yellow drainage at the site, swelling or pain.    IF GROIN STARTS BLEEDING SIGNIFICANTLY:   LAY FLAT, HOLD FIRM DIRECT PRESSURE, AND CALL 911    Other Instructions:  Hold Metformin or Metformin containing drugs for 48 hours after procedure.      Podiatry:   Right leg dressed with generous santyl on the 3 layers of Adaptic, saline soaked gauze over the adaptic, dry gauze to the dorsal foot and anterior leg followed by Kerlix just finger rolled on and a 4 inch Ace about the foot to ankle and 6 inch Ace around the ankle to the leg with almost no compression.     betadine daily.   Keep heels off bed.

## 2024-09-05 LAB
BASOPHILS # BLD: 0.1 K/UL (ref 0–0.2)
BASOPHILS NFR BLD: 0.7 %
CRP SERPL-MCNC: 85.2 MG/L (ref 0–5.1)
DEPRECATED RDW RBC AUTO: 21.1 % (ref 12.4–15.4)
EOSINOPHIL # BLD: 0.2 K/UL (ref 0–0.6)
EOSINOPHIL NFR BLD: 2.6 %
ERYTHROCYTE [SEDIMENTATION RATE] IN BLOOD BY WESTERGREN METHOD: 22 MM/HR (ref 0–30)
HBV SURFACE AB SERPL IA-ACNC: <3.5 MIU/ML
HBV SURFACE AG SERPL QL IA: NORMAL
HCT VFR BLD AUTO: 33.4 % (ref 36–48)
HGB BLD-MCNC: 10.5 G/DL (ref 12–16)
LYMPHOCYTES # BLD: 0.8 K/UL (ref 1–5.1)
LYMPHOCYTES NFR BLD: 10.6 %
MCH RBC QN AUTO: 25.5 PG (ref 26–34)
MCHC RBC AUTO-ENTMCNC: 31.6 G/DL (ref 31–36)
MCV RBC AUTO: 80.8 FL (ref 80–100)
MONOCYTES # BLD: 0.7 K/UL (ref 0–1.3)
MONOCYTES NFR BLD: 9.4 %
NEUTROPHILS # BLD: 5.7 K/UL (ref 1.7–7.7)
NEUTROPHILS NFR BLD: 76.7 %
PLATELET # BLD AUTO: 265 K/UL (ref 135–450)
PMV BLD AUTO: 7.2 FL (ref 5–10.5)
RBC # BLD AUTO: 4.13 M/UL (ref 4–5.2)
WBC # BLD AUTO: 7.5 K/UL (ref 4–11)

## 2024-09-05 PROCEDURE — 6360000002 HC RX W HCPCS: Performed by: SPECIALIST

## 2024-09-05 PROCEDURE — 85025 COMPLETE CBC W/AUTO DIFF WBC: CPT

## 2024-09-05 PROCEDURE — 6370000000 HC RX 637 (ALT 250 FOR IP): Performed by: INTERNAL MEDICINE

## 2024-09-05 PROCEDURE — 87340 HEPATITIS B SURFACE AG IA: CPT

## 2024-09-05 PROCEDURE — 97530 THERAPEUTIC ACTIVITIES: CPT

## 2024-09-05 PROCEDURE — 94761 N-INVAS EAR/PLS OXIMETRY MLT: CPT

## 2024-09-05 PROCEDURE — 6370000000 HC RX 637 (ALT 250 FOR IP): Performed by: STUDENT IN AN ORGANIZED HEALTH CARE EDUCATION/TRAINING PROGRAM

## 2024-09-05 PROCEDURE — 86140 C-REACTIVE PROTEIN: CPT

## 2024-09-05 PROCEDURE — 85652 RBC SED RATE AUTOMATED: CPT

## 2024-09-05 PROCEDURE — 6360000002 HC RX W HCPCS: Performed by: INTERNAL MEDICINE

## 2024-09-05 PROCEDURE — 6370000000 HC RX 637 (ALT 250 FOR IP): Performed by: SPECIALIST

## 2024-09-05 PROCEDURE — 94640 AIRWAY INHALATION TREATMENT: CPT

## 2024-09-05 PROCEDURE — 97535 SELF CARE MNGMENT TRAINING: CPT

## 2024-09-05 PROCEDURE — 86706 HEP B SURFACE ANTIBODY: CPT

## 2024-09-05 PROCEDURE — 99232 SBSQ HOSP IP/OBS MODERATE 35: CPT | Performed by: INTERNAL MEDICINE

## 2024-09-05 PROCEDURE — 1200000000 HC SEMI PRIVATE

## 2024-09-05 PROCEDURE — 2580000003 HC RX 258: Performed by: INTERNAL MEDICINE

## 2024-09-05 PROCEDURE — 36415 COLL VENOUS BLD VENIPUNCTURE: CPT

## 2024-09-05 RX ORDER — POLYETHYLENE GLYCOL 3350 17 G/17G
17 POWDER, FOR SOLUTION ORAL DAILY
Status: DISCONTINUED | OUTPATIENT
Start: 2024-09-05 | End: 2024-09-07 | Stop reason: HOSPADM

## 2024-09-05 RX ORDER — OXYCODONE AND ACETAMINOPHEN 5; 325 MG/1; MG/1
1 TABLET ORAL EVERY 4 HOURS PRN
Status: DISCONTINUED | OUTPATIENT
Start: 2024-09-05 | End: 2024-09-07 | Stop reason: HOSPADM

## 2024-09-05 RX ADMIN — CARVEDILOL 6.25 MG: 6.25 TABLET, FILM COATED ORAL at 09:04

## 2024-09-05 RX ADMIN — ISOSORBIDE MONONITRATE 30 MG: 30 TABLET, EXTENDED RELEASE ORAL at 09:03

## 2024-09-05 RX ADMIN — ASPIRIN 81 MG: 81 TABLET, CHEWABLE ORAL at 09:03

## 2024-09-05 RX ADMIN — Medication: at 17:41

## 2024-09-05 RX ADMIN — OXYCODONE HYDROCHLORIDE AND ACETAMINOPHEN 1 TABLET: 5; 325 TABLET ORAL at 17:40

## 2024-09-05 RX ADMIN — BUDESONIDE AND FORMOTEROL FUMARATE DIHYDRATE 2 PUFF: 160; 4.5 AEROSOL RESPIRATORY (INHALATION) at 07:30

## 2024-09-05 RX ADMIN — LEVOTHYROXINE SODIUM 225 MCG: 0.12 TABLET ORAL at 05:58

## 2024-09-05 RX ADMIN — PIPERACILLIN AND TAZOBACTAM 3375 MG: 3; .375 INJECTION, POWDER, LYOPHILIZED, FOR SOLUTION INTRAVENOUS at 05:53

## 2024-09-05 RX ADMIN — ACETAMINOPHEN 500 MG: 500 TABLET ORAL at 17:10

## 2024-09-05 RX ADMIN — GABAPENTIN 300 MG: 300 CAPSULE ORAL at 20:58

## 2024-09-05 RX ADMIN — PANTOPRAZOLE SODIUM 40 MG: 40 TABLET, DELAYED RELEASE ORAL at 05:58

## 2024-09-05 RX ADMIN — LINEZOLID 600 MG: 600 INJECTION, SOLUTION INTRAVENOUS at 03:41

## 2024-09-05 RX ADMIN — SEVELAMER CARBONATE 800 MG: 800 TABLET, FILM COATED ORAL at 09:04

## 2024-09-05 RX ADMIN — SEVELAMER CARBONATE 800 MG: 800 TABLET, FILM COATED ORAL at 17:10

## 2024-09-05 RX ADMIN — PIPERACILLIN AND TAZOBACTAM 3375 MG: 3; .375 INJECTION, POWDER, LYOPHILIZED, FOR SOLUTION INTRAVENOUS at 18:18

## 2024-09-05 RX ADMIN — OXYCODONE HYDROCHLORIDE AND ACETAMINOPHEN 1 TABLET: 5; 325 TABLET ORAL at 12:22

## 2024-09-05 RX ADMIN — CLOPIDOGREL BISULFATE 75 MG: 75 TABLET ORAL at 09:04

## 2024-09-05 RX ADMIN — POLYETHYLENE GLYCOL 3350 17 G: 17 POWDER, FOR SOLUTION ORAL at 18:14

## 2024-09-05 RX ADMIN — SEVELAMER CARBONATE 800 MG: 800 TABLET, FILM COATED ORAL at 12:23

## 2024-09-05 RX ADMIN — ATORVASTATIN CALCIUM 40 MG: 40 TABLET, FILM COATED ORAL at 20:58

## 2024-09-05 RX ADMIN — OXYCODONE HYDROCHLORIDE AND ACETAMINOPHEN 1 TABLET: 5; 325 TABLET ORAL at 22:14

## 2024-09-05 RX ADMIN — CARVEDILOL 6.25 MG: 6.25 TABLET, FILM COATED ORAL at 17:10

## 2024-09-05 RX ADMIN — LINEZOLID 600 MG: 600 INJECTION, SOLUTION INTRAVENOUS at 17:11

## 2024-09-05 RX ADMIN — OXYCODONE HYDROCHLORIDE AND ACETAMINOPHEN 1 TABLET: 5; 325 TABLET ORAL at 03:39

## 2024-09-05 ASSESSMENT — PAIN DESCRIPTION - DESCRIPTORS
DESCRIPTORS: ACHING
DESCRIPTORS: ACHING
DESCRIPTORS: ACHING;SHARP
DESCRIPTORS: ACHING

## 2024-09-05 ASSESSMENT — PAIN DESCRIPTION - LOCATION
LOCATION: LEG

## 2024-09-05 ASSESSMENT — PAIN DESCRIPTION - ORIENTATION
ORIENTATION: LEFT;LOWER
ORIENTATION: RIGHT;LOWER
ORIENTATION: LEFT;LOWER

## 2024-09-05 ASSESSMENT — PAIN SCALES - GENERAL
PAINLEVEL_OUTOF10: 9
PAINLEVEL_OUTOF10: 7
PAINLEVEL_OUTOF10: 9
PAINLEVEL_OUTOF10: 10
PAINLEVEL_OUTOF10: 3
PAINLEVEL_OUTOF10: 7
PAINLEVEL_OUTOF10: 5

## 2024-09-05 ASSESSMENT — PAIN SCALES - WONG BAKER: WONGBAKER_NUMERICALRESPONSE: NO HURT

## 2024-09-05 NOTE — PROGRESS NOTES
Physical Therapy  Facility/Department: [address] MED SURG  Physical Therapy treatment session- COTX with OT    Name: Shanna Mejia  : 1955  MRN: [id number]  Date of Service: 2024    Discharge Recommendations:  Patient would benefit from continued therapy after discharge (3-5x/wk)   PT Equipment Recommendations  Equipment Needed: No  Other: defer to next level of care    Shanna Mejia scored a  on the AM-PAC short mobility form. Current research shows that an AM-PAC score of 17 or less is typically not associated with a discharge to the patient's home setting. Based on the patient's AM-PAC score and their current functional mobility deficits, it is recommended that the patient have 3-5 sessions per week of Physical Therapy at d/c to increase the patient's independence.  Please see assessment section for further patient specific details.    If patient discharges prior to next session this note will serve as a discharge summary.  Please see below for the latest assessment towards goals.        Patient Diagnosis(es): The primary encounter diagnosis was Diabetic foot ulcer associated with other specified diabetes mellitus, unspecified laterality, unspecified part of foot, unspecified ulcer stage (HCC). Diagnoses of Dry gangrene ([facility]) and PAD (peripheral artery disease) ([facility]) were also pertinent to this visit.  Past Medical History:  has a past medical history of Carotid artery stenosis, CHF (congestive heart failure) (HCC), CKD (chronic kidney disease), Coronary artery disease, Diabetes mellitus (HCC), Dilated cardiomyopathy (HCC), Gout, Hemodialysis patient (HCC), Hyperlipidemia, Hypertension, Hypothyroidism, Ischemic stroke (HCC), Lung nodule, Neuropathy, Osteopenia, Peripheral arterial disease (HCC), Peripheral neuropathy, and Tobacco use disorder.  Past Surgical History:  has a past surgical history that includes Carotid endarterectomy (Left); Tubal ligation; Hysterectomy; Breast cyst incision and  drainage; vitrectomy (Left, 02/18/2019); Refractive surgery (Left, 02/18/2019); Coronary angioplasty with stent (03/2019); Cardiac catheterization; Intracapsular cataract extraction (Left, 07/30/2020); Intracapsular cataract extraction (Right, 05/13/2022); Cataract removal (Right, 05/2022); IR TUNNELED CVC PLACE WO SQ PORT/PUMP > 5 YEARS (Right, 03/13/2024); IR TUNNELED CVC PLACE WO SQ PORT/PUMP > 5 YEARS (3/13/2024); Dialysis fistula creation (Left, 7/11/2024); Cardiac procedure (N/A, 7/26/2024); invasive vascular (N/A, 7/30/2024); Cardiac procedure (N/A, 9/4/2024); invasive vascular (N/A, 9/4/2024); and invasive vascular (N/A, 9/4/2024).    Assessment  Body Structures, Functions, Activity Limitations Requiring Skilled Therapeutic Intervention: Decreased functional mobility ;Decreased body mechanics;Decreased strength;Decreased balance;Decreased posture;Increased pain;Decreased endurance;Decreased safe awareness  Assessment: Pt is a 67 yo female who was adm to [facility] with bilateral leg pain critical limb ishemia with multiple wounds. S/p Peripheral angiography,  Angioplasty iliac,  Intravascular lithotripsy on 9/4.  Pt at baseline lives alone in an apt but has multiple steps to get in/out of her apt.  Pt currently is a high fall risk and well below her baseline for mobility tasks.  She required modAx2 for stand pivot transfers this date and unable to ambulate due to severe RLE pain.  Pt is not safe to discharge directly home due to needing to negotiate stairs to get in and out of apt 3x/wk for dialysis.  Recommend skilled pt 3-5 X frequency at discharge to address deficits to allow pt to regain her max potential  Treatment Diagnosis: Decreased activity tolerance due to increased pain  Specific Instructions for Next Treatment: co-tx  Therapy Prognosis: Fair  Requires PT Follow-Up: Yes  Activity Tolerance  Activity Tolerance: Patient limited by pain    Plan  Physical Therapy Plan  General Plan: 3-5 times per  understanding;Continued education needed      Therapy Time   Individual Concurrent Group Co-treatment   Time In 1009         Time Out 1047         Minutes 38         Timed Code Treatment Minutes: 38 Minutes       Electronically signed by Danyelle Head, PT [id number] on 9/5/2024 at 11:14 AM

## 2024-09-05 NOTE — PROGRESS NOTES
Podiatric Surgery Daily Progress Note  Shanna Mejia  Subjective :   Patient seen and examined this noon at the bedside.  Patient has family with her, states she is feeling much better since angioplasty procedure pain is improved but still present to both legs and feet.  Patient denies any acute overnight events. Patient denies N/V/F/C/SOB. Patient denies calf pain, thigh pain, chest pain.     Review of Systems: A 12 point review of symptoms is unremarkable with the exception of the chief complaint. Patient specifically denies nausea, fever, vomiting, chills, shortness of breath, chest pain, abdominal pain, constipation or difficulty urinating.       Objective     /74   Pulse 65   Temp 97.7 °F (36.5 °C) (Oral)   Resp 16   Ht 1.727 m (5' 8\")   Wt 56 kg (123 lb 7.3 oz)   SpO2 97%   BMI 18.77 kg/m²      I/O:  Intake/Output Summary (Last 24 hours) at 9/5/2024 1222  Last data filed at 9/5/2024 0908  Gross per 24 hour   Intake 120 ml   Output 5 ml   Net 115 ml              Wt Readings from Last 3 Encounters:   09/05/24 56 kg (123 lb 7.3 oz)   08/01/24 56.5 kg (124 lb 9 oz)   07/11/24 58.1 kg (128 lb)       LABS:    Recent Labs     09/04/24  0900 09/05/24  0559   WBC 7.8 7.5   HGB 11.0* 10.5*   HCT 33.9* 33.4*    265        Recent Labs     09/04/24  0900   *   K 3.9   CL 98*   CO2 28   BUN 27*   CREATININE 2.5*      No results for input(s): \"INR\", \"APTT\" in the last 72 hours.    Invalid input(s): \"PROT\"        LOWER EXTREMITY EXAMINATION    VASCULAR: DP and PT pulses are non palpable  b/l. CFT is sluggish to absent to the digits of the foot b/l. Skin temperature is cool to cool from proximal to distal with no focal calor noted. 3+ pitting edema noted with increased skin lines from recent reduction in total edema. Extremely tender to touch of the legs.      NEUROLOGIC: Gross and epicritic sensation is diminished b/l. Protective sensation is diminished at all pedal sites b/l.     DERMATOLOGIC: Skin

## 2024-09-05 NOTE — PROGRESS NOTES
Occupational Therapy  Facility/Department: [address] MED SURG  Occupational Therapy Daily Treatment Note    Name: Shanna Mejia  : 1955  MRN: [id number]  Date of Service: 2024    Discharge Recommendations:  3-5 sessions per week, Patient would benefit from continued therapy after discharge  OT Equipment Recommendations  Other: defer to d/c site    Shanna Mejia scored a 14/24 on the AM-PAC ADL Inpatient form. Current research shows that an AM-PAC score of 17 or less is typically not associated with a discharge to the patient's home setting. Based on the patient's AM-PAC score and their current ADL deficits, it is recommended that the patient have 3-5 sessions per week of Occupational Therapy at d/c to increase the patient's independence.  Please see assessment section for further patient specific details.    If patient discharges prior to next session this note will serve as a discharge summary.  Please see below for the latest assessment towards goals.       Patient Diagnosis(es): The primary encounter diagnosis was Diabetic foot ulcer associated with other specified diabetes mellitus, unspecified laterality, unspecified part of foot, unspecified ulcer stage (HCC). Diagnoses of Dry gangrene ([facility]) and PAD (peripheral artery disease) ([facility]) were also pertinent to this visit.  Past Medical History:  has a past medical history of Carotid artery stenosis, CHF (congestive heart failure) (HCC), CKD (chronic kidney disease), Coronary artery disease, Diabetes mellitus (HCC), Dilated cardiomyopathy (HCC), Gout, Hemodialysis patient (HCC), Hyperlipidemia, Hypertension, Hypothyroidism, Ischemic stroke (HCC), Lung nodule, Neuropathy, Osteopenia, Peripheral arterial disease (HCC), Peripheral neuropathy, and Tobacco use disorder.  Past Surgical History:  has a past surgical history that includes Carotid endarterectomy (Left); Tubal ligation; Hysterectomy; Breast cyst incision and drainage; vitrectomy (Left, 2019);  Independent  Active : No  Patient's  Info: medical transport to/from HD. Family come over each time to assist with steps to car  Leisure & Hobbies: watch TV. Used to go to the [employer]    Objective     Observation/Palpation  Posture: Fair  Observation: bandage R shin dry and intact  Safety Devices  Type of Devices: Bed alarm in place;Left in bed;Call light within reach;Patient at risk for falls;Nurse notified;All fall risk precautions in place;Gait belt;All ann prominences offloaded  Restraints  Restraints Initially in Place: No     Toilet Transfers  Toilet - Technique: Stand pivot  Equipment Used: Standard bedside commode  Toilet Transfer: Moderate assistance;2 Person assistance  Toilet Transfers Comments: pt completes stand pivot tx from bed<>[facility] with modA x2; VCs for safe hand placement     ADL  LE Dressing: Maximum assistance  LE Dressing Skilled Clinical Factors: maxA to don/doff bilateral footie socks  Toileting: Dependent/Total  Toileting Skilled Clinical Factors: totalA to complete pericare in stance at [facility] after voiding  Functional Mobility Skilled Clinical Factors: pt completes stand pivot tx from bed<>[facility] with modA x2 this date        Bed mobility  Supine to Sit: Moderate assistance (HOB elevated, increased time and effort)  Sit to Supine: Stand by assistance (HOB flat, increased time and effort)  Scooting: Moderate assistance  Transfers  Stand Pivot Transfers: Moderate assistance;2 Person assistance  Transfer Comments: bed<>[facility]  Vision  Vision: Impaired  Vision Exceptions: Wears glasses for reading  Hearing  Hearing: Within functional limits  Cognition  Overall Cognitive Status: WFL  Orientation  Overall Orientation Status: Within Functional Limits               Static Sitting Balance Exercises: SBA sitting EOB x5 minutes in prep for fxl tx; SUP seated on BSC to void x5 minutes  Static Standing Balance Exercises: Javier during passive pericare in stance at [facility] x1 minute  Education Given To:

## 2024-09-05 NOTE — PLAN OF CARE
Problem: Discharge Planning  Goal: Discharge to home or other facility with appropriate resources  9/4/2024 2146 by Susan Collins RN  Outcome: Progressing  Flowsheets (Taken 9/4/2024 2146)  Discharge to home or other facility with appropriate resources:   Identify barriers to discharge with patient and caregiver   Arrange for needed discharge resources and transportation as appropriate   Identify discharge learning needs (meds, wound care, etc)     Problem: Pain  Goal: Verbalizes/displays adequate comfort level or baseline comfort level  9/4/2024 2146 by Susan Collins RN  Outcome: Progressing  Flowsheets (Taken 9/4/2024 2146)  Verbalizes/displays adequate comfort level or baseline comfort level:   Encourage patient to monitor pain and request assistance   Assess pain using appropriate pain scale   Administer analgesics based on type and severity of pain and evaluate response   Implement non-pharmacological measures as appropriate and evaluate response     Problem: Safety - Adult  Goal: Free from fall injury  9/4/2024 2146 by Susan Collins RN  Outcome: Progressing  Flowsheets (Taken 9/4/2024 2146)  Free From Fall Injury: Instruct family/caregiver on patient safety     Problem: Skin/Tissue Integrity  Goal: Absence of new skin breakdown  Description: 1.  Monitor for areas of redness and/or skin breakdown  2.  Assess vascular access sites hourly  3.  Every 4-6 hours minimum:  Change oxygen saturation probe site  4.  Every 4-6 hours:  If on nasal continuous positive airway pressure, respiratory therapy assess nares and determine need for appliance change or resting period.  9/4/2024 2146 by Susan Collins RN  Outcome: Progressing     Problem: ABCDS Injury Assessment  Goal: Absence of physical injury  9/4/2024 2146 by Susan Collins RN  Outcome: Progressing  Flowsheets (Taken 9/4/2024 2146)  Absence of Physical Injury: Implement safety measures based on patient assessment     Problem: Chronic

## 2024-09-05 NOTE — PLAN OF CARE
Problem: Pain  Goal: Verbalizes/displays adequate comfort level or baseline comfort level  9/5/2024 0927 by Velia Chao RN  Outcome: Progressing  9/4/2024 2146 by Susan Collins RN  Outcome: Progressing  Flowsheets (Taken 9/4/2024 2146)  Verbalizes/displays adequate comfort level or baseline comfort level:   Encourage patient to monitor pain and request assistance   Assess pain using appropriate pain scale   Administer analgesics based on type and severity of pain and evaluate response   Implement non-pharmacological measures as appropriate and evaluate response     Problem: Safety - Adult  Goal: Free from fall injury  9/4/2024 2146 by Susan Collins RN  Outcome: Progressing  Flowsheets (Taken 9/4/2024 2146)  Free From Fall Injury: Instruct family/caregiver on patient safety     Problem: Skin/Tissue Integrity  Goal: Absence of new skin breakdown  Description: 1.  Monitor for areas of redness and/or skin breakdown  2.  Assess vascular access sites hourly  3.  Every 4-6 hours minimum:  Change oxygen saturation probe site  4.  Every 4-6 hours:  If on nasal continuous positive airway pressure, respiratory therapy assess nares and determine need for appliance change or resting period.  9/5/2024 0927 by Velia Chao, RN  Outcome: Progressing  9/4/2024 2146 by Susan Collins RN  Outcome: Progressing

## 2024-09-05 NOTE — CARE COORDINATION
9/5 Plan: Return to home with family and resume HHC with [facility] and [facility] for [facility]. HD on MWF at [facility]. Ex- is C/G and will transport. Electronically signed by Christiana Murray RN on 9/5/2024 at 4:01 PM

## 2024-09-05 NOTE — PROGRESS NOTES
Patient is AXOX 4. Patient tolerating ADULT DIET; Regular and has no c/o N/V or pain. Shift assessment completed, VSS.   Vitals:    09/04/24 2028   BP:    Pulse:    Resp:    Temp:    SpO2: 92%   Scheduled medications administered except for gabapentin, the patient declined.  Careplan and education was discussed with Shanna Mejia and mutually agreed upon. Patient voiced no concerns at this time. Does not appear to be in distress. Call light in reach, safety precautions in place.

## 2024-09-05 NOTE — PROGRESS NOTES
Inpatient Note    CC: right foot gangrene  Summary:   Shanna Mejia is being seen by nephrology for ESRD. She is a 68 y.o. female with a PMH significant for CHF, HTN, peripheral vascular disease, active smoker, T2DM who presented to the ED 9/2/2024 with worsening right foot gangrenous ulcers and pain. She is admitted for further management.    Interval History  Seen at bedside  /70  On room air  Labs reviewed.  Hb 11.0  No acute electrolyte abnormalities.  S/p BLE angiography yesterday.    Plan:   - no acute indication for HD today  - plan for HD tomorrow. Try to challenge EDW tomorrow.  - counseled about smoking cessation.    Assessment:   ESRD:  - on HD MWF at [facility]  - Access: TDC  - EDW 56 kg    Hypertension:  - home regimen: carvedilol 6.25 mg BID, imdur 30 mg daily  - LVEF 40% (10/2023)    SHPTH:  - gets calcitriol 2.5 mcg qHD as outpatient. Continue  - renvela for hyperphosphatemia    PVD:  - chronic gangrenous wounds to RLE with acute worsening  - s/p BLE angiography 9/4/2024: Renal arteries: patent  Abdominal Aorta:  marked calcification.     Right Common Iliac:  30%  Stenosis  Right Internal Iliac:  is  patent  Right External Iliac:  is  patent  Left Common Iliac:  90% stenosis  Left Internal Iliac:  is  patent  Left External Iliac:  is  patent  Right Leg  Common Femoral:  moderate 60% stenosis  Profunda: patent without significant disease  Superficial femoral: severe disease proximally extending into a mid vessel   Popliteal: severe disease proximal  that reconstitutes prior to PT trunk  - Percutaneous balloon angioplasty of right iliac artery using a 7.0 mm x 60 mm balloon   - Percutaneous balloon angioplasty of right iliac artery using a 7.0 mm x 80 mm shockwave balloon, intravascular lithotripsy performed   - Percutaneous balloon angioplasty of right iliac artery using a 8.0 mm x 60 mm balloon   A completion arteriogram was

## 2024-09-05 NOTE — PROGRESS NOTES
[facility]   Daily Progress Note      Admit Date:  9/2/2024      Subjective:   Ms. Mejia is a 69yo female with severe PAD and ESRD on HD who presented to [facility] with bilateral lower leg/foot wounds consistent with dry gangrene. She underwent peripheral angiography and intervention to the right common iliac artery on 9/4/24.      Interval History:  Radha reports that she has bilateral lower leg pain.  She does have range of motion and sensation here however.  She says that her wounds have not healed significantly over the last 3 to 4 weeks.      Objective:     /74   Pulse 65   Temp 97.7 °F (36.5 °C) (Oral)   Resp 12   Ht 1.727 m (5' 8\")   Wt 56 kg (123 lb 7.3 oz)   SpO2 97%   BMI 18.77 kg/m²      Intake/Output Summary (Last 24 hours) at 9/5/2024 1451  Last data filed at 9/5/2024 0908  Gross per 24 hour   Intake 120 ml   Output --   Net 120 ml       Physical Exam:  General:  Awake, alert, NAD  Skin:  Warm and dry  Neck:  JVD<8, no carotid bruits  Chest:  Clear to auscultation, no wheezes/rhonchi/rales  Cardiovascular:  RRR, normal S1/S2, no M/R/G  Abdomen:  Soft, nontender, +bowel sounds  Extremities:  Trace bilateral LE edema  Pulses: 2+ bilat carotid    Left foot with necrotic first and second toes.  Dressing in place on right foot with full-thickness ulceration noted by wound care on pretibial surface.            Medications:    piperacillin-tazobactam  3,375 mg IntraVENous Q12H    heparin (porcine)  1,000 Units IntraVENous Q MWF    calcitRIOL  2.5 mcg Oral Once per day on Monday Wednesday Friday    levothyroxine  225 mcg Oral Daily    atorvastatin  40 mg Oral Nightly    aspirin  81 mg Oral Daily    budesonide-formoterol  2 puff Inhalation BID RT    carvedilol  6.25 mg Oral BID WC    clopidogrel  75 mg Oral Daily    gabapentin  300 mg Oral QHS    isosorbide mononitrate  30 mg Oral Daily    pantoprazole  40 mg Oral QAM AC    sevelamer  800 mg Oral TID WC    linezolid  600 mg  debridement.    ESRD on HD  Dialysis per nephrology and Dr. Vogel.                Signed:  KARIN CONLEY MD

## 2024-09-05 NOTE — PROGRESS NOTES
Hospitalist Progress Note  9/5/2024 2:05 PM  Subjective:   Admit Date: 9/2/2024  PCP: Sona Thomas MD  Interval History: pt is c/o pain R leg  Meds are not helping  No BM  S/p angio  Feels better otherwise    Chart reviewed.  Diet: ADULT DIET; Regular  Medications:   Scheduled Meds:   piperacillin-tazobactam  3,375 mg IntraVENous Q12H    heparin (porcine)  1,000 Units IntraVENous Q MWF    calcitRIOL  2.5 mcg Oral Once per day on Monday Wednesday Friday    levothyroxine  225 mcg Oral Daily    atorvastatin  40 mg Oral Nightly    aspirin  81 mg Oral Daily    budesonide-formoterol  2 puff Inhalation BID RT    carvedilol  6.25 mg Oral BID WC    clopidogrel  75 mg Oral Daily    gabapentin  300 mg Oral QHS    isosorbide mononitrate  30 mg Oral Daily    pantoprazole  40 mg Oral QAM AC    sevelamer  800 mg Oral TID WC    linezolid  600 mg IntraVENous Q12H     Continuous Infusions:  CBC:   Recent Labs     09/02/24  1533 09/04/24  0900 09/05/24  0559   WBC 7.2 7.8 7.5   HGB 13.0 11.0* 10.5*    281 265     BMP:    Recent Labs     09/02/24  1533 09/04/24  0900   * 135*   K 3.8 3.9   CL 97* 98*   CO2 23 28   BUN 30* 27*   CREATININE 2.6* 2.5*   GLUCOSE 86 92     Hepatic: No results for input(s): \"AST\", \"ALT\", \"BILITOT\", \"ALKPHOS\" in the last 72 hours.    Invalid input(s): \"ALB\"  Troponin: No results for input(s): \"TROPONINI\" in the last 72 hours.  BNP: No results for input(s): \"BNP\" in the last 72 hours.  Lipids: No results for input(s): \"CHOL\", \"HDL\" in the last 72 hours.    Invalid input(s): \"LDLCALCU\"  INR: No results for input(s): \"INR\" in the last 72 hours.    Objective:   Vitals: /74   Pulse 65   Temp 97.7 °F (36.5 °C) (Oral)   Resp 16   Ht 1.727 m (5' 8\")   Wt 56 kg (123 lb 7.3 oz)   SpO2 97%   BMI 18.77 kg/m²   General appearance: alert,awake,oriented x 3 and cooperative with exam  HEENT: Head: Normal, normocephalic, atraumatic, anicteric, pupils are reactive to light. Dry mucous  membrane.  Neck: no adenopathy, no carotid bruit, supple, symmetrical, trachea midline and thyroid not enlarged, symmetric, no tenderness/mass/nodules  Lungs: clear to auscultation bilaterally  Heart: regular rate and rhythm, S1, S2 normal, no murmur, click, rub or gallop  Abdomen: soft, non-tender; bowel sounds normal; no masses,  no organomegaly  Extremities: extremities mild edema  Dressing on the R LE  Neurologic: Mental status: Alert, oriented, thought content appropriate    Assessment and Plan:   Gangrene of the toe- stable  PAD-s/p revascularization  Esrd- on HD  Open area on the R leg-wound care    Patient Active Problem List:     Chronic cerebrovascular accident (CVA)     Atheroscler native arteries the extremities w/intermit claudication ([facility])     Chronic renal impairment, stage 4 (severe) ([facility])     Bilateral leg edema     NICM (nonischemic cardiomyopathy) ([facility])     Bilateral carotid artery disease ([facility])     Type 2 diabetes mellitus without complication, without long-term current use of insulin ([facility])     Primary hypertension     Diabetes mellitus type 2 with neurological manifestations ([facility])     Sensory ataxic gait     Smoker     Sciatic pain, left     Status post coronary artery stent placement     Acute decompensated heart failure (HCC)     Congestive heart failure, unspecified HF chronicity, unspecified heart failure type (HCC)     Acute on chronic combined systolic and diastolic congestive heart failure (HCC)     Acute kidney injury superimposed on CKD ([facility])     Atherosclerosis of native coronary artery of native heart without angina pectoris     Demand ischemia ([facility])     PAD (peripheral artery disease) ([facility])     LVH (left ventricular hypertrophy) due to hypertensive disease, with heart failure ([facility])     Tobacco abuse     Tobacco abuse counseling     Acute respiratory failure ([facility])     Acute respiratory failure with hypoxia ([facility])     Hypertensive urgency     Chronic kidney disease     Acute on chronic

## 2024-09-06 ENCOUNTER — TELEPHONE (OUTPATIENT)
Dept: CARDIOLOGY CLINIC | Age: 69
End: 2024-09-06

## 2024-09-06 LAB
ALBUMIN SERPL-MCNC: 3.1 G/DL (ref 3.4–5)
ANION GAP SERPL CALCULATED.3IONS-SCNC: 15 MMOL/L (ref 3–16)
ANION GAP SERPL CALCULATED.3IONS-SCNC: 17 MMOL/L (ref 3–16)
BACTERIA BLD CULT ORG #2: NORMAL
BACTERIA BLD CULT: NORMAL
BUN SERPL-MCNC: 30 MG/DL (ref 7–20)
BUN SERPL-MCNC: 30 MG/DL (ref 7–20)
CALCIUM SERPL-MCNC: 8.5 MG/DL (ref 8.3–10.6)
CALCIUM SERPL-MCNC: 8.5 MG/DL (ref 8.3–10.6)
CHLORIDE SERPL-SCNC: 91 MMOL/L (ref 99–110)
CHLORIDE SERPL-SCNC: 92 MMOL/L (ref 99–110)
CO2 SERPL-SCNC: 17 MMOL/L (ref 21–32)
CO2 SERPL-SCNC: 19 MMOL/L (ref 21–32)
CREAT SERPL-MCNC: 3.7 MG/DL (ref 0.6–1.2)
CREAT SERPL-MCNC: 3.7 MG/DL (ref 0.6–1.2)
GFR SERPLBLD CREATININE-BSD FMLA CKD-EPI: 13 ML/MIN/{1.73_M2}
GFR SERPLBLD CREATININE-BSD FMLA CKD-EPI: 13 ML/MIN/{1.73_M2}
GLUCOSE SERPL-MCNC: 87 MG/DL (ref 70–99)
GLUCOSE SERPL-MCNC: 87 MG/DL (ref 70–99)
PHOSPHATE SERPL-MCNC: 6.2 MG/DL (ref 2.5–4.9)
POTASSIUM SERPL-SCNC: 4.2 MMOL/L (ref 3.5–5.1)
POTASSIUM SERPL-SCNC: 4.2 MMOL/L (ref 3.5–5.1)
SODIUM SERPL-SCNC: 125 MMOL/L (ref 136–145)
SODIUM SERPL-SCNC: 126 MMOL/L (ref 136–145)

## 2024-09-06 PROCEDURE — 2580000003 HC RX 258: Performed by: INTERNAL MEDICINE

## 2024-09-06 PROCEDURE — 99232 SBSQ HOSP IP/OBS MODERATE 35: CPT | Performed by: INTERNAL MEDICINE

## 2024-09-06 PROCEDURE — 94640 AIRWAY INHALATION TREATMENT: CPT

## 2024-09-06 PROCEDURE — 1200000000 HC SEMI PRIVATE

## 2024-09-06 PROCEDURE — 6370000000 HC RX 637 (ALT 250 FOR IP): Performed by: SPECIALIST

## 2024-09-06 PROCEDURE — 94760 N-INVAS EAR/PLS OXIMETRY 1: CPT

## 2024-09-06 PROCEDURE — 6360000002 HC RX W HCPCS: Performed by: SPECIALIST

## 2024-09-06 PROCEDURE — 6370000000 HC RX 637 (ALT 250 FOR IP): Performed by: STUDENT IN AN ORGANIZED HEALTH CARE EDUCATION/TRAINING PROGRAM

## 2024-09-06 PROCEDURE — 97530 THERAPEUTIC ACTIVITIES: CPT

## 2024-09-06 PROCEDURE — 36415 COLL VENOUS BLD VENIPUNCTURE: CPT

## 2024-09-06 PROCEDURE — 90935 HEMODIALYSIS ONE EVALUATION: CPT

## 2024-09-06 PROCEDURE — 6370000000 HC RX 637 (ALT 250 FOR IP): Performed by: INTERNAL MEDICINE

## 2024-09-06 PROCEDURE — 97535 SELF CARE MNGMENT TRAINING: CPT

## 2024-09-06 PROCEDURE — 80069 RENAL FUNCTION PANEL: CPT

## 2024-09-06 PROCEDURE — 6360000002 HC RX W HCPCS: Performed by: INTERNAL MEDICINE

## 2024-09-06 RX ADMIN — CARVEDILOL 6.25 MG: 6.25 TABLET, FILM COATED ORAL at 17:33

## 2024-09-06 RX ADMIN — SEVELAMER CARBONATE 800 MG: 800 TABLET, FILM COATED ORAL at 09:27

## 2024-09-06 RX ADMIN — PIPERACILLIN AND TAZOBACTAM 3375 MG: 3; .375 INJECTION, POWDER, LYOPHILIZED, FOR SOLUTION INTRAVENOUS at 06:40

## 2024-09-06 RX ADMIN — CALCITRIOL 2.5 MCG: 0.25 CAPSULE ORAL at 22:17

## 2024-09-06 RX ADMIN — Medication: at 12:20

## 2024-09-06 RX ADMIN — SEVELAMER CARBONATE 800 MG: 800 TABLET, FILM COATED ORAL at 12:03

## 2024-09-06 RX ADMIN — ASPIRIN 81 MG: 81 TABLET, CHEWABLE ORAL at 09:27

## 2024-09-06 RX ADMIN — CARVEDILOL 6.25 MG: 6.25 TABLET, FILM COATED ORAL at 09:27

## 2024-09-06 RX ADMIN — LINEZOLID 600 MG: 600 INJECTION, SOLUTION INTRAVENOUS at 04:22

## 2024-09-06 RX ADMIN — ATORVASTATIN CALCIUM 40 MG: 40 TABLET, FILM COATED ORAL at 21:07

## 2024-09-06 RX ADMIN — OXYCODONE HYDROCHLORIDE AND ACETAMINOPHEN 1 TABLET: 5; 325 TABLET ORAL at 09:26

## 2024-09-06 RX ADMIN — CLOPIDOGREL BISULFATE 75 MG: 75 TABLET ORAL at 09:27

## 2024-09-06 RX ADMIN — BUDESONIDE AND FORMOTEROL FUMARATE DIHYDRATE 2 PUFF: 160; 4.5 AEROSOL RESPIRATORY (INHALATION) at 08:38

## 2024-09-06 RX ADMIN — BUDESONIDE AND FORMOTEROL FUMARATE DIHYDRATE 2 PUFF: 160; 4.5 AEROSOL RESPIRATORY (INHALATION) at 19:30

## 2024-09-06 RX ADMIN — SEVELAMER CARBONATE 800 MG: 800 TABLET, FILM COATED ORAL at 17:33

## 2024-09-06 RX ADMIN — GABAPENTIN 300 MG: 300 CAPSULE ORAL at 21:12

## 2024-09-06 RX ADMIN — OXYCODONE HYDROCHLORIDE AND ACETAMINOPHEN 1 TABLET: 5; 325 TABLET ORAL at 17:33

## 2024-09-06 RX ADMIN — OXYCODONE HYDROCHLORIDE AND ACETAMINOPHEN 1 TABLET: 5; 325 TABLET ORAL at 22:19

## 2024-09-06 RX ADMIN — PANTOPRAZOLE SODIUM 40 MG: 40 TABLET, DELAYED RELEASE ORAL at 06:24

## 2024-09-06 RX ADMIN — LEVOTHYROXINE SODIUM 225 MCG: 0.12 TABLET ORAL at 06:24

## 2024-09-06 RX ADMIN — ISOSORBIDE MONONITRATE 30 MG: 30 TABLET, EXTENDED RELEASE ORAL at 09:27

## 2024-09-06 ASSESSMENT — PAIN - FUNCTIONAL ASSESSMENT
PAIN_FUNCTIONAL_ASSESSMENT: ACTIVITIES ARE NOT PREVENTED
PAIN_FUNCTIONAL_ASSESSMENT: ACTIVITIES ARE NOT PREVENTED

## 2024-09-06 ASSESSMENT — PAIN DESCRIPTION - ORIENTATION
ORIENTATION: RIGHT;LEFT
ORIENTATION: RIGHT;LOWER

## 2024-09-06 ASSESSMENT — PAIN DESCRIPTION - DESCRIPTORS
DESCRIPTORS: ACHING
DESCRIPTORS: STABBING;SHARP
DESCRIPTORS: SHARP

## 2024-09-06 ASSESSMENT — PAIN DESCRIPTION - LOCATION
LOCATION: LEG

## 2024-09-06 ASSESSMENT — PAIN SCALES - GENERAL
PAINLEVEL_OUTOF10: 0
PAINLEVEL_OUTOF10: 7
PAINLEVEL_OUTOF10: 9
PAINLEVEL_OUTOF10: 9

## 2024-09-06 ASSESSMENT — PAIN SCALES - WONG BAKER: WONGBAKER_NUMERICALRESPONSE: NO HURT

## 2024-09-06 NOTE — DIALYSIS
Hemodialysis Treatment Note:     Treatment time: 3.5  hours    Net UF: 2.3 L     Pre weight: 57.6 kg  Post weight: 55.3 kg   EDW: 55.5 kg adjusted down from 56 kg     Access used: CVC   Access function: Works well at a pump speed of 350. Dressing changed and site care provided per protocol.     Medications or blood products given: NA    Summary of response to treatment: Treatment generally well tolerated. 1 kg challenge to dry weight per Dr. Farfan. Pt did not tolerate dt cramping, but EDW able to be lowered by 0.5 kg. BP stable throughout session. All blood returned w/ rinse back. Pt stable w/ no complaints following tx. Report given to primary RN.

## 2024-09-06 NOTE — PROGRESS NOTES
Occupational Therapy  Facility/Department: [address] MED SURG  Occupational Therapy Daily Treatment Note  This note to serve as OT d/c summary if pt is d/c-ed from hospital prior to next OT session.      Name: Shanna Mejia  : 1955  MRN: [id number]  Date of Service: 2024    Discharge Recommendations:  3-5 sessions per week, Patient would benefit from continued therapy after discharge          Patient Diagnosis(es): The primary encounter diagnosis was Diabetic foot ulcer associated with other specified diabetes mellitus, unspecified laterality, unspecified part of foot, unspecified ulcer stage (HCC). Diagnoses of Dry gangrene (HCC) and PAD (peripheral artery disease) (HCC) were also pertinent to this visit.  Past Medical History:  has a past medical history of Carotid artery stenosis, CHF (congestive heart failure) (HCC), CKD (chronic kidney disease), Coronary artery disease, Diabetes mellitus (HCC), Dilated cardiomyopathy (HCC), Gout, Hemodialysis patient (HCC), Hyperlipidemia, Hypertension, Hypothyroidism, Ischemic stroke (HCC), Lung nodule, Neuropathy, Osteopenia, Peripheral arterial disease (HCC), Peripheral neuropathy, and Tobacco use disorder.  Past Surgical History:  has a past surgical history that includes Carotid endarterectomy (Left); Tubal ligation; Hysterectomy; Breast cyst incision and drainage; vitrectomy (Left, 2019); Refractive surgery (Left, 2019); Coronary angioplasty with stent (2019); Cardiac catheterization; Intracapsular cataract extraction (Left, 2020); Intracapsular cataract extraction (Right, 2022); Cataract removal (Right, 2022); IR TUNNELED CVC PLACE WO SQ PORT/PUMP > 5 YEARS (Right, 2024); IR TUNNELED CVC PLACE WO SQ PORT/PUMP > 5 YEARS (3/13/2024); Dialysis fistula creation (Left, 2024); Cardiac procedure (N/A, 2024); invasive vascular (N/A, 2024); Cardiac procedure (N/A, 2024); invasive vascular (N/A, 2024); and  and time with assistance for RLE primary due to increased pain       Cognition  Overall Cognitive Status: WFL  Orientation  Overall Orientation Status: Within Functional Limits               Static Sitting Balance Exercises: Sat EOB for 5 minutes with SBA.  Static Standing Balance Exercises: Stood EOB for 1 minute with mod assist x 1.  While standing bed linens were changed.                      AM-PAC - ADL  AM-PAC Daily Activity - Inpatient   How much help is needed for putting on and taking off regular lower body clothing?: Total  How much help is needed for bathing (which includes washing, rinsing, drying)?: A Lot  How much help is needed for toileting (which includes using toilet, bedpan, or urinal)?: Total  How much help is needed for putting on and taking off regular upper body clothing?: A Little  How much help is needed for taking care of personal grooming?: A Little  How much help for eating meals?: None  AM-[facility] Inpatient Daily Activity Raw Score: 14  AM-PAC Inpatient ADL T-Scale Score : 33.39  ADL Inpatient CMS 0-100% Score: 59.67  ADL Inpatient CMS G-Code Modifier : CK    Goals  Short Term Goals  Time Frame for Short Term Goals: Prior to d/c: all goals ongoing 9/5  Short Term Goal 1: Pt will complete ADL transfers w/ SBA  Short Term Goal 2: Pt will toilet SBA  Short Term Goal 3: Pt will bathe min A  Short Term Goal 4: Pt will groom sinkside w/ setup/SBA  Short Term Goal 5: Pt will complete 10 reps UE therex in all planes to increase strength/endurance for ADLs  Long Term Goals  Time Frame for Long Term Goals : LTG=STG  Patient Goals   Patient goals : to return home      Therapy Time   Individual Concurrent Group Co-treatment   Time In 1055         Time Out 1130         Minutes 35               YORDY Santillan/PANFILO [id number]

## 2024-09-06 NOTE — TELEPHONE ENCOUNTER
Patient still currently in [facility]. Dr. Mandujano did peripheral cath 9/4/24, per note     \"Staged intervention of SFA with Dr. Thurston, will have this scheduled\"     Forwarding to Dr. Thurston's RN for follow up.

## 2024-09-06 NOTE — PROGRESS NOTES
Inpatient Note    CC: right foot gangrene  Summary:   Shanna Mejia is being seen by nephrology for ESRD. She is a 68 y.o. female with a PMH significant for CHF, HTN, peripheral vascular disease, active smoker, T2DM who presented to the ED 9/2/2024 with worsening right foot gangrenous ulcers and pain. She is admitted for further management.    Interval History  Seen at bedside  /84  On room air and denied SOB  No pain or tenderness around TDC. Clean dressing in place.   Na 135 9/4 > 126   K 4.2  Bicarbonate 17      Plan:   Plan for HD today with UF as tolerated. Adjusted dialysis prescription  Need strict fluid restriction as Na dropped. Patient is asymptomatic. Follow Na level  Follow BP        D/W [facility] charge nurse  D/W floor nurse for fluid restriction.         Assessment:   ESRD:  - on HD MWF at [facility]  - Access: TDC  - EDW 56 kg    Hypertension:  - home regimen: carvedilol 6.25 mg BID, imdur 30 mg daily  - LVEF 40% (10/2023)    SHPTH:  - gets calcitriol 2.5 mcg qHD as outpatient. Continue  - renvela for hyperphosphatemia    Hyponatremia      PVD:  - chronic gangrenous wounds to RLE with acute worsening  - s/p BLE angiography 9/4/2024: Renal arteries: patent  Abdominal Aorta:  marked calcification.     Right Common Iliac:  30%  Stenosis  Right Internal Iliac:  is  patent  Right External Iliac:  is  patent  Left Common Iliac:  90% stenosis  Left Internal Iliac:  is  patent  Left External Iliac:  is  patent  Right Leg  Common Femoral:  moderate 60% stenosis  Profunda: patent without significant disease  Superficial femoral: severe disease proximally extending into a mid vessel   Popliteal: severe disease proximal  that reconstitutes prior to PT trunk  - Percutaneous balloon angioplasty of right iliac artery using a 7.0 mm x 60 mm balloon   - Percutaneous balloon angioplasty of right iliac artery using a 7.0 mm x 80 mm shockwave balloon,  intravascular lithotripsy performed   - Percutaneous balloon angioplasty of right iliac artery using a 8.0 mm x 60 mm balloon   A completion arteriogram was performed.  Pre:  90% stenosis common iliac  Post:   0% residual stenosis      Zakiya Allen MD  [facility] Nephrology  Office: [phone]          PE:   Vitals:    09/05/24 2244   BP:    Pulse:    Resp: 16   Temp:    SpO2:        General appearance: in NAD  Respiratory: No RD on room air. No wheezing.   Cardiovascular: Mild  + edema  Abdomen: Soft, non distended and non tender  Neuro: AAO x 3

## 2024-09-06 NOTE — PROGRESS NOTES
Physical Therapy  Facility/Department: [address] MED SURG  Physical Therapy Treatment Session     Name: Shanna Mejia  : 1955  MRN: [id number]  Date of Service: 2024    Discharge Recommendations:  Patient would benefit from continued therapy after discharge (3-5x/wk)   PT Equipment Recommendations  Equipment Needed: No  Other: defer to next level of care    Shanna Mejia scored a  on the AM-PAC short mobility form. Current research shows that an AM-PAC score of 17 or less is typically not associated with a discharge to the patient's home setting. Based on the patient's AM-PAC score and their current functional mobility deficits, it is recommended that the patient have 3-5 sessions per week of Physical Therapy at d/c to increase the patient's independence.  Please see assessment section for further patient specific details.    If patient discharges prior to next session this note will serve as a discharge summary.  Please see below for the latest assessment towards goals.     Patient Diagnosis(es): The primary encounter diagnosis was Diabetic foot ulcer associated with other specified diabetes mellitus, unspecified laterality, unspecified part of foot, unspecified ulcer stage (HCC). Diagnoses of Dry gangrene ([facility]) and PAD (peripheral artery disease) ([facility]) were also pertinent to this visit.  Past Medical History:  has a past medical history of Carotid artery stenosis, CHF (congestive heart failure) (HCC), CKD (chronic kidney disease), Coronary artery disease, Diabetes mellitus (HCC), Dilated cardiomyopathy (HCC), Gout, Hemodialysis patient (HCC), Hyperlipidemia, Hypertension, Hypothyroidism, Ischemic stroke (HCC), Lung nodule, Neuropathy, Osteopenia, Peripheral arterial disease (HCC), Peripheral neuropathy, and Tobacco use disorder.  Past Surgical History:  has a past surgical history that includes Carotid endarterectomy (Left); Tubal ligation; Hysterectomy; Breast cyst incision and drainage; vitrectomy  per week  Specific Instructions for Next Treatment: co-tx  Current Treatment Recommendations: Strengthening, Balance training, Transfer training, Functional mobility training, Gait training, Stair training, Neuromuscular re-education, Patient/Caregiver education & training, Safety education & training, Home exercise program, Equipment evaluation, education, & procurement, Endurance training, Therapeutic activities, Co-Treatment  Safety Devices  Type of Devices: Call light within reach, Bed alarm in place, Left in bed, Gait belt, Nurse notified, Patient at risk for falls (RN informed of patient with discomfort RLE due to ace wrap and stating she is going to remove at this time)  Restraints  Restraints Initially in Place: No    Restrictions  Restrictions/Precautions  Restrictions/Precautions: Fall Risk, Up as Tolerated     Subjective  General  Chart Reviewed: Yes  Patient assessed for rehabilitation services?: Yes  Additional Pertinent Hx: \"CHIEF COMPLAINT    Leg Swelling (Right foot ankle swelling and pain/chronic wound to right shin.  Patient states she was seeing wound care for wound.  )     DIEGO Mejia is a 68 y.o. female with past medical history significant for CHF, CKD, DM who presents with foot wound  · Patient here today with acute on chronic wounds to the bilateral lower extremities she also she does not follow with wound care but has been perhaps loss of follow-up is unclear the exact wound care management she has received in the outpatient setting, she tells me that today progressive bilateral leg swelling which is causing her pain which prompted her to come to the ER today she denies any fever or chills she tells using doxycycline at this time for a wound infection  · She tells me that she has this wound on the right tibia as well as the left toe that is black that is been this way for the last 3 weeks the wounds are not improving  \" (Luiz Pereira MD  09/02/24 0633)  Response To Previous  needed walking in hospital room?: Total  How much help is needed climbing 3-5 steps with a railing?: Total  AM-PAC Inpatient Mobility Raw Score : 11  AM-PAC Inpatient T-Scale Score : 33.86  Mobility Inpatient CMS 0-100% Score: 72.57  Mobility Inpatient CMS G-Code Modifier : CL         Goals  Short Term Goals  Time Frame for Short Term Goals: Prior to discharge (goals ongoing as of 9/6)  Short Term Goal 1: Bed mobility CGA  Short Term Goal 2: Transfer sit <-> stand to LRAD min assist  Short Term Goal 3: Pt will be able to perform lateral transfer with LRAD min assist  Short Term Goal 4: Pt will be able to ambulate with LRAD 15' with min assist  Long Term Goals  Time Frame for Long Term Goals : STG=LTG  Patient Goals   Patient Goals : \"return home\"       Education  Patient Education  Education Given To: Patient  Education Provided: Role of Therapy;Plan of Care;Transfer Training;Equipment  Education Method: Demonstration;Verbal  Barriers to Learning: None  Education Outcome: Verbalized understanding;Demonstrated understanding;Continued education needed      Therapy Time   Individual Concurrent Group Co-treatment   Time In 1055         Time Out 1130         Minutes 35         Timed Code Treatment Minutes: 35 Minutes       Molly Parker, PT     Molly Parker PT, DPT, CNS [id number] 09/06/24 11:34 AM

## 2024-09-06 NOTE — PROGRESS NOTES
Hospitalist Progress Note  9/6/2024 5:41 PM  Subjective:   Admit Date: 9/2/2024  PCP: Sona Thomas MD  Interval History: pt is c/o pain R leg  Meds are not helping  Just had HD  Feels weak and tired  Feels better otherwise    Chart reviewed.  Diet: ADULT DIET; Regular; 1200 ml  Medications:   Scheduled Meds:   collagenase   Topical Daily    polyethylene glycol  17 g Oral Daily    heparin (porcine)  1,000 Units IntraVENous Q MWF    calcitRIOL  2.5 mcg Oral Once per day on Monday Wednesday Friday    levothyroxine  225 mcg Oral Daily    atorvastatin  40 mg Oral Nightly    aspirin  81 mg Oral Daily    budesonide-formoterol  2 puff Inhalation BID RT    carvedilol  6.25 mg Oral BID WC    clopidogrel  75 mg Oral Daily    gabapentin  300 mg Oral QHS    isosorbide mononitrate  30 mg Oral Daily    pantoprazole  40 mg Oral QAM AC    sevelamer  800 mg Oral TID WC     Continuous Infusions:  CBC:   Recent Labs     09/04/24  0900 09/05/24  0559   WBC 7.8 7.5   HGB 11.0* 10.5*    265     BMP:    Recent Labs     09/04/24  0900 09/06/24  0752   * 126*  125*   K 3.9 4.2  4.2   CL 98* 92*  91*   CO2 28 17*  19*   BUN 27* 30*  30*   CREATININE 2.5* 3.7*  3.7*   GLUCOSE 92 87  87     Hepatic: No results for input(s): \"AST\", \"ALT\", \"BILITOT\", \"ALKPHOS\" in the last 72 hours.    Invalid input(s): \"ALB\"  Troponin: No results for input(s): \"TROPONINI\" in the last 72 hours.  BNP: No results for input(s): \"BNP\" in the last 72 hours.  Lipids: No results for input(s): \"CHOL\", \"HDL\" in the last 72 hours.    Invalid input(s): \"LDLCALCU\"  INR: No results for input(s): \"INR\" in the last 72 hours.    Objective:   Vitals: BP (!) 148/79   Pulse 80   Temp 98 °F (36.7 °C)   Resp 16   Ht 1.727 m (5' 8\")   Wt 57.6 kg (126 lb 15.8 oz)   SpO2 98%   BMI 19.31 kg/m²   General appearance: alert,awake,oriented x 3 and cooperative with exam  HEENT: Head: Normal, normocephalic, atraumatic, anicteric, pupils are reactive to light.  on chronic combined systolic and diastolic heart failure (HCC)     Heart failure (HCC)     Chronic systolic heart failure (HCC)     Dyspnea     Acute on chronic congestive heart failure (HCC)     Stage 3 chronic kidney disease (HCC)     Pleural effusion on right     Pneumonia of right lung due to infectious organism     Hyperkalemia     End stage renal disease on dialysis (HCC)     Ischemic ulcer of toe of left foot, limited to breakdown of skin (HCC)     Open wound of right lower leg     Atherosclerosis of native arteries of extremities with rest pain, bilateral legs (HCC)     Cellulitis     Critical lower limb ischemia (HCC)     Hyperlipidemia LDL goal <70     Right leg swelling     Severe malnutrition (HCC)     Dry gangrene (HCC)     Diabetic foot ulcer (HCC)    Pt is stable.  Possible d/c in am  Discussed with staff and pt about the plan.  See the orders for further plan.  Will proceed further acc to pt's progress.  Time spent with management of the pt is-20 minutes      Electronically signed by: Sona Thomas MD, on 9/6/2024, at 5:41 PM

## 2024-09-06 NOTE — PLAN OF CARE
Problem: Discharge Planning  Goal: Discharge to home or other facility with appropriate resources  Outcome: Progressing     Problem: Pain  Goal: Verbalizes/displays adequate comfort level or baseline comfort level  9/5/2024 2228 by Quita Cyr RN  Outcome: Progressing  9/5/2024 0927 by Velia Chao RN  Outcome: Progressing     Problem: Safety - Adult  Goal: Free from fall injury  Outcome: Progressing     Problem: Skin/Tissue Integrity  Goal: Absence of new skin breakdown  Description: 1.  Monitor for areas of redness and/or skin breakdown  2.  Assess vascular access sites hourly  3.  Every 4-6 hours minimum:  Change oxygen saturation probe site  4.  Every 4-6 hours:  If on nasal continuous positive airway pressure, respiratory therapy assess nares and determine need for appliance change or resting period.  9/5/2024 2228 by Quita Cyr RN  Outcome: Progressing  9/5/2024 0927 by Velia Chao RN  Outcome: Progressing     Problem: ABCDS Injury Assessment  Goal: Absence of physical injury  Outcome: Progressing     Problem: Chronic Conditions and Co-morbidities  Goal: Patient's chronic conditions and co-morbidity symptoms are monitored and maintained or improved  9/5/2024 2228 by Quita Cyr RN  Outcome: Progressing  9/5/2024 0927 by Velia Chao RN  Outcome: Progressing

## 2024-09-07 VITALS
RESPIRATION RATE: 16 BRPM | DIASTOLIC BLOOD PRESSURE: 74 MMHG | HEIGHT: 68 IN | HEART RATE: 75 BPM | WEIGHT: 129.63 LBS | OXYGEN SATURATION: 92 % | BODY MASS INDEX: 19.65 KG/M2 | TEMPERATURE: 97.7 F | SYSTOLIC BLOOD PRESSURE: 119 MMHG

## 2024-09-07 PROCEDURE — 6370000000 HC RX 637 (ALT 250 FOR IP): Performed by: INTERNAL MEDICINE

## 2024-09-07 PROCEDURE — 6370000000 HC RX 637 (ALT 250 FOR IP): Performed by: SPECIALIST

## 2024-09-07 PROCEDURE — 94640 AIRWAY INHALATION TREATMENT: CPT

## 2024-09-07 PROCEDURE — 94760 N-INVAS EAR/PLS OXIMETRY 1: CPT

## 2024-09-07 PROCEDURE — 99238 HOSP IP/OBS DSCHRG MGMT 30/<: CPT | Performed by: INTERNAL MEDICINE

## 2024-09-07 RX ORDER — POLYETHYLENE GLYCOL 3350 17 G/17G
17 POWDER, FOR SOLUTION ORAL DAILY
Qty: 527 G | Refills: 1 | Status: SHIPPED | OUTPATIENT
Start: 2024-09-08 | End: 2024-10-08

## 2024-09-07 RX ADMIN — SEVELAMER CARBONATE 800 MG: 800 TABLET, FILM COATED ORAL at 07:52

## 2024-09-07 RX ADMIN — CLOPIDOGREL BISULFATE 75 MG: 75 TABLET ORAL at 07:52

## 2024-09-07 RX ADMIN — ASPIRIN 81 MG: 81 TABLET, CHEWABLE ORAL at 07:52

## 2024-09-07 RX ADMIN — PANTOPRAZOLE SODIUM 40 MG: 40 TABLET, DELAYED RELEASE ORAL at 05:44

## 2024-09-07 RX ADMIN — OXYCODONE HYDROCHLORIDE AND ACETAMINOPHEN 1 TABLET: 5; 325 TABLET ORAL at 10:29

## 2024-09-07 RX ADMIN — BUDESONIDE AND FORMOTEROL FUMARATE DIHYDRATE 2 PUFF: 160; 4.5 AEROSOL RESPIRATORY (INHALATION) at 08:51

## 2024-09-07 RX ADMIN — Medication: at 07:54

## 2024-09-07 RX ADMIN — LEVOTHYROXINE SODIUM 225 MCG: 0.12 TABLET ORAL at 05:44

## 2024-09-07 RX ADMIN — ISOSORBIDE MONONITRATE 30 MG: 30 TABLET, EXTENDED RELEASE ORAL at 07:52

## 2024-09-07 RX ADMIN — SEVELAMER CARBONATE 800 MG: 800 TABLET, FILM COATED ORAL at 12:12

## 2024-09-07 RX ADMIN — CARVEDILOL 6.25 MG: 6.25 TABLET, FILM COATED ORAL at 07:52

## 2024-09-07 ASSESSMENT — PAIN SCALES - GENERAL
PAINLEVEL_OUTOF10: 0
PAINLEVEL_OUTOF10: 10

## 2024-09-07 ASSESSMENT — PAIN DESCRIPTION - ORIENTATION: ORIENTATION: RIGHT;LEFT

## 2024-09-07 ASSESSMENT — PAIN DESCRIPTION - DESCRIPTORS: DESCRIPTORS: ACHING

## 2024-09-07 ASSESSMENT — PAIN DESCRIPTION - LOCATION: LOCATION: LEG

## 2024-09-07 NOTE — PLAN OF CARE
Problem: Discharge Planning  Goal: Discharge to home or other facility with appropriate resources  9/7/2024 0934 by Jovanna Price RN  Outcome: Progressing  9/6/2024 2325 by Quita Cyr RN  Outcome: Progressing     Problem: Pain  Goal: Verbalizes/displays adequate comfort level or baseline comfort level  9/7/2024 0934 by Jovanna Price RN  Outcome: Progressing  9/6/2024 2325 by Quita Cyr RN  Outcome: Progressing     Problem: Safety - Adult  Goal: Free from fall injury  9/7/2024 0934 by Jovanna Price RN  Outcome: Progressing  9/6/2024 2325 by Quita Cyr RN  Outcome: Progressing     Problem: Skin/Tissue Integrity  Goal: Absence of new skin breakdown  Description: 1.  Monitor for areas of redness and/or skin breakdown  2.  Assess vascular access sites hourly  3.  Every 4-6 hours minimum:  Change oxygen saturation probe site  4.  Every 4-6 hours:  If on nasal continuous positive airway pressure, respiratory therapy assess nares and determine need for appliance change or resting period.  9/7/2024 0934 by Jovanna Price RN  Outcome: Progressing  9/6/2024 2325 by Quita Cyr RN  Outcome: Progressing     Problem: ABCDS Injury Assessment  Goal: Absence of physical injury  9/7/2024 0934 by Jovanna Price RN  Outcome: Progressing  9/6/2024 2325 by Quita Cyr RN  Outcome: Progressing     Problem: Chronic Conditions and Co-morbidities  Goal: Patient's chronic conditions and co-morbidity symptoms are monitored and maintained or improved  9/7/2024 0934 by Jovanna Price RN  Outcome: Progressing  9/6/2024 2325 by Quita Cyr RN  Outcome: Progressing

## 2024-09-07 NOTE — PLAN OF CARE
Problem: Discharge Planning  Goal: Discharge to home or other facility with appropriate resources  9/6/2024 2325 by Quita Cyr RN  Outcome: Progressing  9/6/2024 1826 by Sara Sotelo RN  Outcome: Progressing     Problem: Pain  Goal: Verbalizes/displays adequate comfort level or baseline comfort level  9/6/2024 2325 by Quita Cyr RN  Outcome: Progressing  9/6/2024 1826 by Sara Sotelo RN  Outcome: Progressing     Problem: Safety - Adult  Goal: Free from fall injury  9/6/2024 2325 by Quita Cyr RN  Outcome: Progressing  9/6/2024 1826 by Sara Sotelo RN  Outcome: Progressing     Problem: Skin/Tissue Integrity  Goal: Absence of new skin breakdown  Description: 1.  Monitor for areas of redness and/or skin breakdown  2.  Assess vascular access sites hourly  3.  Every 4-6 hours minimum:  Change oxygen saturation probe site  4.  Every 4-6 hours:  If on nasal continuous positive airway pressure, respiratory therapy assess nares and determine need for appliance change or resting period.  9/6/2024 2325 by Quita Cyr RN  Outcome: Progressing  9/6/2024 1826 by Sara Sotelo RN  Outcome: Progressing     Problem: ABCDS Injury Assessment  Goal: Absence of physical injury  9/6/2024 2325 by Quita Cyr RN  Outcome: Progressing  9/6/2024 1826 by Sara Sotelo RN  Outcome: Progressing     Problem: Chronic Conditions and Co-morbidities  Goal: Patient's chronic conditions and co-morbidity symptoms are monitored and maintained or improved  9/6/2024 2325 by Quita Cyr RN  Outcome: Progressing  9/6/2024 1826 by Sara Sotelo RN  Outcome: Progressing

## 2024-09-07 NOTE — PROGRESS NOTES
AVS reviewed with patient. IV removed without complications. Educated on follow up appointments and wound care. Belongings gathered. Patient discharged home with spouse.

## 2024-09-07 NOTE — CARE COORDINATION
DISCHARGE SUMMARY     DATE OF DISCHARGE: 9/7/24    DISCHARGE DESTINATION: Home    HOME CARE: Yes  Discharging to Facility/ Agency   Name: [facility]  Address:  [address], [facility] [address]  Phone:  [phone]  Fax:  [phone]     HEMODIALYSIS: Yes  Dialysis Facility (if applicable)   Name: [facility] [id number] [place] [address]  Phone [phone]  Fax  [phone]  Schedule: MWF  Chair Time: 7:00 am-11:00 am    TRANSPORTATION: Private Car    NEW DME ORDERED: no    COMMENTS: HD notes faxed to [facility] [phone]. Orders called to [facility]. Electronically signed by Christiana Murray RN on 9/7/2024 at 11:50 AM

## 2024-09-15 NOTE — SEDATION DOCUMENTATION
Pre-Sedation:  Pre-Sedation Documentation and Exam:  I have personally completed a history, physical exam & review of systems for this patient (see notes).    Prior History of Anesthesia Complications:   none    Modified Mallampati:  II (soft palate, uvula, fauces visible)    ASA Classification:  Class 2 - A normal healthy patient with mild systemic disease    Christian Scale:  Activity:  2 - Able to move 4 extremities voluntarily on command  Respiration:  2 - Able to breathe deeply and cough freely  Circulation:  2 - BP+/- 20mmHg of normal  Consciousness:  2 - Fully awake  Oxygen Saturation (color):  2 - Able to maintain oxygen saturation >92% on room air    Sedation/Anesthesia Plan:  Guard the patient's safety and welfare.  Minimize physical discomfort and pain.  Minimize negative psychological responses to treatment by providing sedation and analgesia and maximize the potential amnesia.  Patient to meet pre-procedure discharge plan.    Medication Planned:  midazolam intravenously and fentanyl intravenously    Patient is an appropriate candidate for plan of sedation:   yes     Pt in CT

## 2024-09-16 LAB — POC ACT LR: 177 SEC

## 2024-09-19 ENCOUNTER — HOSPITAL ENCOUNTER (OUTPATIENT)
Dept: WOUND CARE | Age: 69
Discharge: HOME | End: 2024-09-19
Attending: NURSE PRACTITIONER
Payer: MEDICARE

## 2024-09-19 ENCOUNTER — APPOINTMENT (OUTPATIENT)
Dept: GENERAL RADIOLOGY | Age: 69
End: 2024-09-19
Payer: MEDICARE

## 2024-09-19 ENCOUNTER — HOSPITAL ENCOUNTER (EMERGENCY)
Age: 69
Discharge: HOME | End: 2024-09-20
Payer: MEDICARE

## 2024-09-19 VITALS
HEART RATE: 88 BPM | TEMPERATURE: 98.2 F | BODY MASS INDEX: 19.55 KG/M2 | SYSTOLIC BLOOD PRESSURE: 152 MMHG | WEIGHT: 129 LBS | HEIGHT: 68 IN | RESPIRATION RATE: 18 BRPM | DIASTOLIC BLOOD PRESSURE: 82 MMHG

## 2024-09-19 VITALS
HEART RATE: 84 BPM | DIASTOLIC BLOOD PRESSURE: 81 MMHG | TEMPERATURE: 98.1 F | BODY MASS INDEX: 19.61 KG/M2 | SYSTOLIC BLOOD PRESSURE: 137 MMHG | OXYGEN SATURATION: 96 % | HEIGHT: 68 IN | RESPIRATION RATE: 15 BRPM

## 2024-09-19 DIAGNOSIS — I50.9 CONGESTIVE HEART FAILURE, UNSPECIFIED HF CHRONICITY, UNSPECIFIED HEART FAILURE TYPE (HCC): ICD-10-CM

## 2024-09-19 DIAGNOSIS — M79.604 ACUTE PAIN OF RIGHT LOWER EXTREMITY: ICD-10-CM

## 2024-09-19 DIAGNOSIS — I70.223 ATHEROSCLEROSIS OF NATIVE ARTERIES OF EXTREMITIES WITH REST PAIN, BILATERAL LEGS (HCC): Primary | ICD-10-CM

## 2024-09-19 DIAGNOSIS — N18.6 END STAGE RENAL DISEASE ON DIALYSIS (HCC): ICD-10-CM

## 2024-09-19 DIAGNOSIS — Z99.2 END STAGE RENAL DISEASE ON DIALYSIS (HCC): ICD-10-CM

## 2024-09-19 DIAGNOSIS — R60.0 BILATERAL LOWER EXTREMITY EDEMA: Primary | ICD-10-CM

## 2024-09-19 DIAGNOSIS — R79.89 ELEVATED D-DIMER: ICD-10-CM

## 2024-09-19 DIAGNOSIS — R60.0 BILATERAL LEG EDEMA: ICD-10-CM

## 2024-09-19 DIAGNOSIS — L97.521 ISCHEMIC ULCER OF TOE OF LEFT FOOT, LIMITED TO BREAKDOWN OF SKIN (HCC): ICD-10-CM

## 2024-09-19 DIAGNOSIS — L97.911 NON-PRESSURE CHRONIC ULCER RIGHT LOWER LEG, LIMITED TO BREAKDOWN SKIN (HCC): ICD-10-CM

## 2024-09-19 DIAGNOSIS — S81.801A OPEN WOUND OF RIGHT LOWER LEG, INITIAL ENCOUNTER: ICD-10-CM

## 2024-09-19 LAB
ALBUMIN SERPL-MCNC: 3.8 G/DL (ref 3.4–5)
ALBUMIN/GLOB SERPL: 1.4 {RATIO} (ref 1.1–2.2)
ALP SERPL-CCNC: 115 U/L (ref 40–129)
ALT SERPL-CCNC: 8 U/L (ref 10–40)
ANION GAP SERPL CALCULATED.3IONS-SCNC: 14 MMOL/L (ref 3–16)
ANISOCYTOSIS BLD QL SMEAR: ABNORMAL
AST SERPL-CCNC: 23 U/L (ref 15–37)
BASOPHILS # BLD: 0.1 K/UL (ref 0–0.2)
BASOPHILS NFR BLD: 1 %
BILIRUB SERPL-MCNC: 0.5 MG/DL (ref 0–1)
BUN SERPL-MCNC: 29 MG/DL (ref 7–20)
CALCIUM SERPL-MCNC: 9.1 MG/DL (ref 8.3–10.6)
CHLORIDE SERPL-SCNC: 96 MMOL/L (ref 99–110)
CO2 SERPL-SCNC: 26 MMOL/L (ref 21–32)
CREAT SERPL-MCNC: 3.7 MG/DL (ref 0.6–1.2)
D-DIMER QUANTITATIVE: 1.96 UG/ML FEU (ref 0–0.6)
DEPRECATED RDW RBC AUTO: 20.5 % (ref 12.4–15.4)
EOSINOPHIL # BLD: 0.2 K/UL (ref 0–0.6)
EOSINOPHIL NFR BLD: 2.7 %
GFR SERPLBLD CREATININE-BSD FMLA CKD-EPI: 13 ML/MIN/{1.73_M2}
GLUCOSE SERPL-MCNC: 107 MG/DL (ref 70–99)
HCT VFR BLD AUTO: 36.7 % (ref 36–48)
HGB BLD-MCNC: 11.6 G/DL (ref 12–16)
LYMPHOCYTES # BLD: 1.3 K/UL (ref 1–5.1)
LYMPHOCYTES NFR BLD: 17.8 %
MACROCYTES BLD QL SMEAR: ABNORMAL
MCH RBC QN AUTO: 25.4 PG (ref 26–34)
MCHC RBC AUTO-ENTMCNC: 31.5 G/DL (ref 31–36)
MCV RBC AUTO: 80.7 FL (ref 80–100)
MONOCYTES # BLD: 0.6 K/UL (ref 0–1.3)
MONOCYTES NFR BLD: 8.4 %
NEUTROPHILS # BLD: 5.2 K/UL (ref 1.7–7.7)
NEUTROPHILS NFR BLD: 70.1 %
NT-PROBNP SERPL-MCNC: ABNORMAL PG/ML (ref 0–124)
OVALOCYTES BLD QL SMEAR: ABNORMAL
PLATELET # BLD AUTO: 357 K/UL (ref 135–450)
PLATELET BLD QL SMEAR: ADEQUATE
PMV BLD AUTO: 7.2 FL (ref 5–10.5)
POIKILOCYTOSIS BLD QL SMEAR: ABNORMAL
POLYCHROMASIA BLD QL SMEAR: ABNORMAL
POTASSIUM SERPL-SCNC: 4.5 MMOL/L (ref 3.5–5.1)
PROT SERPL-MCNC: 6.6 G/DL (ref 6.4–8.2)
RBC # BLD AUTO: 4.55 M/UL (ref 4–5.2)
SCHISTOCYTES BLD QL SMEAR: ABNORMAL
SLIDE REVIEW: ABNORMAL
SODIUM SERPL-SCNC: 136 MMOL/L (ref 136–145)
TARGETS BLD QL SMEAR: ABNORMAL
WBC # BLD AUTO: 7.4 K/UL (ref 4–11)

## 2024-09-19 PROCEDURE — 73590 X-RAY EXAM OF LOWER LEG: CPT

## 2024-09-19 PROCEDURE — 99214 OFFICE O/P EST MOD 30 MIN: CPT

## 2024-09-19 PROCEDURE — 85379 FIBRIN DEGRADATION QUANT: CPT

## 2024-09-19 PROCEDURE — 83880 ASSAY OF NATRIURETIC PEPTIDE: CPT

## 2024-09-19 PROCEDURE — 99214 OFFICE O/P EST MOD 30 MIN: CPT | Performed by: NURSE PRACTITIONER

## 2024-09-19 PROCEDURE — 80053 COMPREHEN METABOLIC PANEL: CPT

## 2024-09-19 PROCEDURE — 99285 EMERGENCY DEPT VISIT HI MDM: CPT

## 2024-09-19 PROCEDURE — 85025 COMPLETE CBC W/AUTO DIFF WBC: CPT

## 2024-09-19 PROCEDURE — 93005 ELECTROCARDIOGRAM TRACING: CPT | Performed by: EMERGENCY MEDICINE

## 2024-09-19 RX ORDER — TRIAMCINOLONE ACETONIDE 1 MG/G
OINTMENT TOPICAL ONCE
OUTPATIENT
Start: 2024-09-19 | End: 2024-09-19

## 2024-09-19 RX ORDER — LIDOCAINE HYDROCHLORIDE 20 MG/ML
JELLY TOPICAL ONCE
OUTPATIENT
Start: 2024-09-19 | End: 2024-09-19

## 2024-09-19 RX ORDER — GINSENG 100 MG
CAPSULE ORAL ONCE
OUTPATIENT
Start: 2024-09-19 | End: 2024-09-19

## 2024-09-19 RX ORDER — LIDOCAINE 50 MG/G
OINTMENT TOPICAL ONCE
OUTPATIENT
Start: 2024-09-19 | End: 2024-09-19

## 2024-09-19 RX ORDER — BACITRACIN ZINC AND POLYMYXIN B SULFATE 500; 1000 [USP'U]/G; [USP'U]/G
OINTMENT TOPICAL ONCE
OUTPATIENT
Start: 2024-09-19 | End: 2024-09-19

## 2024-09-19 RX ORDER — BETAMETHASONE DIPROPIONATE 0.5 MG/G
CREAM TOPICAL ONCE
OUTPATIENT
Start: 2024-09-19 | End: 2024-09-19

## 2024-09-19 RX ORDER — SODIUM CHLOR/HYPOCHLOROUS ACID 0.033 %
SOLUTION, IRRIGATION IRRIGATION ONCE
OUTPATIENT
Start: 2024-09-19 | End: 2024-09-19

## 2024-09-19 RX ORDER — LIDOCAINE 40 MG/G
CREAM TOPICAL ONCE
OUTPATIENT
Start: 2024-09-19 | End: 2024-09-19

## 2024-09-19 RX ORDER — LIDOCAINE HYDROCHLORIDE 40 MG/ML
SOLUTION TOPICAL ONCE
OUTPATIENT
Start: 2024-09-19 | End: 2024-09-19

## 2024-09-19 RX ORDER — GENTAMICIN SULFATE 1 MG/G
OINTMENT TOPICAL ONCE
OUTPATIENT
Start: 2024-09-19 | End: 2024-09-19

## 2024-09-19 RX ORDER — LIDOCAINE HYDROCHLORIDE 40 MG/ML
SOLUTION TOPICAL ONCE
Status: COMPLETED | OUTPATIENT
Start: 2024-09-19 | End: 2024-09-19

## 2024-09-19 RX ORDER — NEOMYCIN/BACITRACIN/POLYMYXINB 3.5-400-5K
OINTMENT (GRAM) TOPICAL ONCE
OUTPATIENT
Start: 2024-09-19 | End: 2024-09-19

## 2024-09-19 RX ORDER — SILVER SULFADIAZINE 10 MG/G
CREAM TOPICAL ONCE
OUTPATIENT
Start: 2024-09-19 | End: 2024-09-19

## 2024-09-19 RX ORDER — CLOBETASOL PROPIONATE 0.5 MG/G
OINTMENT TOPICAL ONCE
OUTPATIENT
Start: 2024-09-19 | End: 2024-09-19

## 2024-09-19 RX ORDER — MUPIROCIN 20 MG/G
OINTMENT TOPICAL ONCE
OUTPATIENT
Start: 2024-09-19 | End: 2024-09-19

## 2024-09-19 RX ADMIN — LIDOCAINE HYDROCHLORIDE: 40 SOLUTION TOPICAL at 12:16

## 2024-09-19 ASSESSMENT — PAIN DESCRIPTION - LOCATION
LOCATION: LEG
LOCATION: LEG

## 2024-09-19 ASSESSMENT — PAIN SCALES - GENERAL
PAINLEVEL_OUTOF10: 9
PAINLEVEL_OUTOF10: 9

## 2024-09-19 ASSESSMENT — PAIN DESCRIPTION - FREQUENCY: FREQUENCY: CONTINUOUS

## 2024-09-19 ASSESSMENT — PAIN DESCRIPTION - PAIN TYPE: TYPE: CHRONIC PAIN

## 2024-09-19 ASSESSMENT — PAIN DESCRIPTION - ORIENTATION
ORIENTATION: LEFT;RIGHT
ORIENTATION: RIGHT;LEFT

## 2024-09-19 ASSESSMENT — PAIN - FUNCTIONAL ASSESSMENT
PAIN_FUNCTIONAL_ASSESSMENT: 0-10
PAIN_FUNCTIONAL_ASSESSMENT: PREVENTS OR INTERFERES SOME ACTIVE ACTIVITIES AND ADLS

## 2024-09-19 ASSESSMENT — PAIN DESCRIPTION - DESCRIPTORS: DESCRIPTORS: STABBING

## 2024-09-19 ASSESSMENT — PAIN DESCRIPTION - ONSET: ONSET: ON-GOING

## 2024-09-20 LAB
EKG ATRIAL RATE: 89 BPM
EKG DIAGNOSIS: NORMAL
EKG P AXIS: 36 DEGREES
EKG P-R INTERVAL: 166 MS
EKG Q-T INTERVAL: 374 MS
EKG QRS DURATION: 94 MS
EKG QTC CALCULATION (BAZETT): 455 MS
EKG R AXIS: -69 DEGREES
EKG T AXIS: 77 DEGREES
EKG VENTRICULAR RATE: 89 BPM

## 2024-09-20 PROCEDURE — 93010 ELECTROCARDIOGRAM REPORT: CPT | Performed by: INTERNAL MEDICINE

## 2024-09-26 ENCOUNTER — HOSPITAL ENCOUNTER (OUTPATIENT)
Dept: WOUND CARE | Age: 69
Discharge: HOME | End: 2024-09-26
Attending: NURSE PRACTITIONER
Payer: MEDICARE

## 2024-09-26 VITALS
TEMPERATURE: 97.5 F | RESPIRATION RATE: 20 BRPM | HEART RATE: 87 BPM | SYSTOLIC BLOOD PRESSURE: 143 MMHG | DIASTOLIC BLOOD PRESSURE: 75 MMHG

## 2024-09-26 DIAGNOSIS — I50.9 CONGESTIVE HEART FAILURE, UNSPECIFIED HF CHRONICITY, UNSPECIFIED HEART FAILURE TYPE (HCC): ICD-10-CM

## 2024-09-26 DIAGNOSIS — Z99.2 END STAGE RENAL DISEASE ON DIALYSIS (HCC): ICD-10-CM

## 2024-09-26 DIAGNOSIS — N18.6 END STAGE RENAL DISEASE ON DIALYSIS (HCC): ICD-10-CM

## 2024-09-26 DIAGNOSIS — S81.801A OPEN WOUND OF RIGHT LOWER LEG, INITIAL ENCOUNTER: ICD-10-CM

## 2024-09-26 DIAGNOSIS — R60.0 BILATERAL LEG EDEMA: ICD-10-CM

## 2024-09-26 DIAGNOSIS — I70.223 ATHEROSCLEROSIS OF NATIVE ARTERIES OF EXTREMITIES WITH REST PAIN, BILATERAL LEGS (HCC): Primary | ICD-10-CM

## 2024-09-26 DIAGNOSIS — L97.521 ISCHEMIC ULCER OF TOE OF LEFT FOOT, LIMITED TO BREAKDOWN OF SKIN (HCC): ICD-10-CM

## 2024-09-26 PROCEDURE — 87077 CULTURE AEROBIC IDENTIFY: CPT

## 2024-09-26 PROCEDURE — 87186 SC STD MICRODIL/AGAR DIL: CPT

## 2024-09-26 PROCEDURE — 87075 CULTR BACTERIA EXCEPT BLOOD: CPT

## 2024-09-26 PROCEDURE — 87070 CULTURE OTHR SPECIMN AEROBIC: CPT

## 2024-09-26 PROCEDURE — 87205 SMEAR GRAM STAIN: CPT

## 2024-09-26 PROCEDURE — 11046 DBRDMT MUSC&/FSCA EA ADDL: CPT

## 2024-09-26 PROCEDURE — 11043 DBRDMT MUSC&/FSCA 1ST 20/<: CPT

## 2024-09-26 RX ORDER — SODIUM CHLOR/HYPOCHLOROUS ACID 0.033 %
SOLUTION, IRRIGATION IRRIGATION ONCE
OUTPATIENT
Start: 2024-09-26 | End: 2024-09-26

## 2024-09-26 RX ORDER — NEOMYCIN/BACITRACIN/POLYMYXINB 3.5-400-5K
OINTMENT (GRAM) TOPICAL ONCE
OUTPATIENT
Start: 2024-09-26 | End: 2024-09-26

## 2024-09-26 RX ORDER — LIDOCAINE HYDROCHLORIDE 20 MG/ML
JELLY TOPICAL ONCE
OUTPATIENT
Start: 2024-09-26 | End: 2024-09-26

## 2024-09-26 RX ORDER — SILVER SULFADIAZINE 10 MG/G
CREAM TOPICAL ONCE
OUTPATIENT
Start: 2024-09-26 | End: 2024-09-26

## 2024-09-26 RX ORDER — GINSENG 100 MG
CAPSULE ORAL ONCE
OUTPATIENT
Start: 2024-09-26 | End: 2024-09-26

## 2024-09-26 RX ORDER — BACITRACIN ZINC AND POLYMYXIN B SULFATE 500; 1000 [USP'U]/G; [USP'U]/G
OINTMENT TOPICAL ONCE
OUTPATIENT
Start: 2024-09-26 | End: 2024-09-26

## 2024-09-26 RX ORDER — GENTAMICIN SULFATE 1 MG/G
OINTMENT TOPICAL ONCE
OUTPATIENT
Start: 2024-09-26 | End: 2024-09-26

## 2024-09-26 RX ORDER — TRIAMCINOLONE ACETONIDE 1 MG/G
OINTMENT TOPICAL ONCE
OUTPATIENT
Start: 2024-09-26 | End: 2024-09-26

## 2024-09-26 RX ORDER — CLOBETASOL PROPIONATE 0.5 MG/G
OINTMENT TOPICAL ONCE
OUTPATIENT
Start: 2024-09-26 | End: 2024-09-26

## 2024-09-26 RX ORDER — BETAMETHASONE DIPROPIONATE 0.5 MG/G
CREAM TOPICAL ONCE
OUTPATIENT
Start: 2024-09-26 | End: 2024-09-26

## 2024-09-26 RX ORDER — LIDOCAINE 40 MG/G
CREAM TOPICAL ONCE
OUTPATIENT
Start: 2024-09-26 | End: 2024-09-26

## 2024-09-26 RX ORDER — LIDOCAINE 50 MG/G
OINTMENT TOPICAL ONCE
OUTPATIENT
Start: 2024-09-26 | End: 2024-09-26

## 2024-09-26 RX ORDER — MUPIROCIN 20 MG/G
OINTMENT TOPICAL ONCE
OUTPATIENT
Start: 2024-09-26 | End: 2024-09-26

## 2024-09-26 RX ORDER — LIDOCAINE HYDROCHLORIDE 40 MG/ML
SOLUTION TOPICAL ONCE
Status: COMPLETED | OUTPATIENT
Start: 2024-09-26 | End: 2024-09-26

## 2024-09-26 RX ORDER — LIDOCAINE HYDROCHLORIDE 40 MG/ML
SOLUTION TOPICAL ONCE
OUTPATIENT
Start: 2024-09-26 | End: 2024-09-26

## 2024-09-26 RX ADMIN — LIDOCAINE HYDROCHLORIDE 7.5 ML: 40 SOLUTION TOPICAL at 15:34

## 2024-09-26 ASSESSMENT — ENCOUNTER SYMPTOMS: COLOR CHANGE: 1

## 2024-09-26 ASSESSMENT — PAIN DESCRIPTION - LOCATION: LOCATION: LEG;FOOT

## 2024-09-26 ASSESSMENT — PAIN DESCRIPTION - ONSET: ONSET: ON-GOING

## 2024-09-26 ASSESSMENT — PAIN DESCRIPTION - ORIENTATION: ORIENTATION: RIGHT;LEFT

## 2024-09-26 ASSESSMENT — PAIN DESCRIPTION - FREQUENCY: FREQUENCY: CONTINUOUS

## 2024-09-26 ASSESSMENT — PAIN DESCRIPTION - PAIN TYPE: TYPE: ACUTE PAIN;CHRONIC PAIN

## 2024-09-26 ASSESSMENT — PAIN - FUNCTIONAL ASSESSMENT: PAIN_FUNCTIONAL_ASSESSMENT: PREVENTS OR INTERFERES SOME ACTIVE ACTIVITIES AND ADLS

## 2024-09-26 ASSESSMENT — PAIN DESCRIPTION - DESCRIPTORS: DESCRIPTORS: STABBING;THROBBING

## 2024-09-26 ASSESSMENT — PAIN SCALES - GENERAL
PAINLEVEL_OUTOF10: 9
PAINLEVEL_OUTOF10: 9

## 2024-09-29 LAB
BACTERIA SPEC AEROBE CULT: ABNORMAL
BACTERIA SPEC ANAEROBE CULT: ABNORMAL
BACTERIA SPEC ANAEROBE CULT: ABNORMAL
GRAM STN SPEC: ABNORMAL
GRAM STN SPEC: ABNORMAL
ORGANISM: ABNORMAL

## 2024-10-01 ENCOUNTER — HOSPITAL ENCOUNTER (OUTPATIENT)
Age: 69
Setting detail: OUTPATIENT SURGERY
Discharge: HOME | DRG: 853 | End: 2024-10-01
Attending: INTERNAL MEDICINE | Admitting: INTERNAL MEDICINE
Payer: MEDICARE

## 2024-10-01 VITALS
OXYGEN SATURATION: 98 % | SYSTOLIC BLOOD PRESSURE: 158 MMHG | DIASTOLIC BLOOD PRESSURE: 132 MMHG | WEIGHT: 129 LBS | TEMPERATURE: 97.7 F | RESPIRATION RATE: 18 BRPM | HEIGHT: 68 IN | BODY MASS INDEX: 19.55 KG/M2 | HEART RATE: 94 BPM

## 2024-10-01 DIAGNOSIS — I73.9 PERIPHERAL VASCULAR DISEASE, UNSPECIFIED (HCC): ICD-10-CM

## 2024-10-01 LAB — ECHO BSA: 1.68 M2

## 2024-10-01 PROCEDURE — B41F1ZZ FLUOROSCOPY OF RIGHT LOWER EXTREMITY ARTERIES USING LOW OSMOLAR CONTRAST: ICD-10-PCS | Performed by: INTERNAL MEDICINE

## 2024-10-01 PROCEDURE — 6360000002 HC RX W HCPCS: Performed by: INTERNAL MEDICINE

## 2024-10-01 PROCEDURE — 37224 HC FEM POP TERRITORY PLASTY: CPT | Performed by: INTERNAL MEDICINE

## 2024-10-01 PROCEDURE — 2580000003 HC RX 258: Performed by: INTERNAL MEDICINE

## 2024-10-01 PROCEDURE — 7100000010 HC PHASE II RECOVERY - FIRST 15 MIN: Performed by: INTERNAL MEDICINE

## 2024-10-01 PROCEDURE — 85347 COAGULATION TIME ACTIVATED: CPT

## 2024-10-01 PROCEDURE — C1887 CATHETER, GUIDING: HCPCS | Performed by: INTERNAL MEDICINE

## 2024-10-01 PROCEDURE — 2500000003 HC RX 250 WO HCPCS: Performed by: INTERNAL MEDICINE

## 2024-10-01 PROCEDURE — 99152 MOD SED SAME PHYS/QHP 5/>YRS: CPT | Performed by: INTERNAL MEDICINE

## 2024-10-01 PROCEDURE — C1725 CATH, TRANSLUMIN NON-LASER: HCPCS | Performed by: INTERNAL MEDICINE

## 2024-10-01 PROCEDURE — 2709999900 HC NON-CHARGEABLE SUPPLY: Performed by: INTERNAL MEDICINE

## 2024-10-01 PROCEDURE — 7100000011 HC PHASE II RECOVERY - ADDTL 15 MIN: Performed by: INTERNAL MEDICINE

## 2024-10-01 PROCEDURE — 047K3ZZ DILATION OF RIGHT FEMORAL ARTERY, PERCUTANEOUS APPROACH: ICD-10-PCS | Performed by: INTERNAL MEDICINE

## 2024-10-01 PROCEDURE — C1769 GUIDE WIRE: HCPCS | Performed by: INTERNAL MEDICINE

## 2024-10-01 PROCEDURE — 99153 MOD SED SAME PHYS/QHP EA: CPT | Performed by: INTERNAL MEDICINE

## 2024-10-01 PROCEDURE — 6370000000 HC RX 637 (ALT 250 FOR IP): Performed by: INTERNAL MEDICINE

## 2024-10-01 PROCEDURE — C1894 INTRO/SHEATH, NON-LASER: HCPCS | Performed by: INTERNAL MEDICINE

## 2024-10-01 PROCEDURE — 6360000004 HC RX CONTRAST MEDICATION: Performed by: INTERNAL MEDICINE

## 2024-10-01 RX ORDER — ACETAMINOPHEN 325 MG/1
650 TABLET ORAL EVERY 4 HOURS PRN
Status: DISCONTINUED | OUTPATIENT
Start: 2024-10-01 | End: 2024-10-01 | Stop reason: HOSPADM

## 2024-10-01 RX ORDER — SODIUM CHLORIDE 0.9 % (FLUSH) 0.9 %
5-40 SYRINGE (ML) INJECTION PRN
Status: DISCONTINUED | OUTPATIENT
Start: 2024-10-01 | End: 2024-10-01 | Stop reason: HOSPADM

## 2024-10-01 RX ORDER — HEPARIN SODIUM 1000 [USP'U]/ML
INJECTION, SOLUTION INTRAVENOUS; SUBCUTANEOUS PRN
Status: DISCONTINUED | OUTPATIENT
Start: 2024-10-01 | End: 2024-10-01 | Stop reason: HOSPADM

## 2024-10-01 RX ORDER — HYDRALAZINE HYDROCHLORIDE 20 MG/ML
INJECTION INTRAMUSCULAR; INTRAVENOUS PRN
Status: DISCONTINUED | OUTPATIENT
Start: 2024-10-01 | End: 2024-10-01 | Stop reason: HOSPADM

## 2024-10-01 RX ORDER — PROTAMINE SULFATE 10 MG/ML
20 INJECTION, SOLUTION INTRAVENOUS ONCE
Status: COMPLETED | OUTPATIENT
Start: 2024-10-01 | End: 2024-10-01

## 2024-10-01 RX ORDER — HYDRALAZINE HYDROCHLORIDE 20 MG/ML
10 INJECTION INTRAMUSCULAR; INTRAVENOUS ONCE
Status: COMPLETED | OUTPATIENT
Start: 2024-10-01 | End: 2024-10-01

## 2024-10-01 RX ORDER — OXYCODONE AND ACETAMINOPHEN 10; 325 MG/1; MG/1
1 TABLET ORAL ONCE
Status: COMPLETED | OUTPATIENT
Start: 2024-10-01 | End: 2024-10-01

## 2024-10-01 RX ORDER — SODIUM CHLORIDE 0.9 % (FLUSH) 0.9 %
5-40 SYRINGE (ML) INJECTION EVERY 12 HOURS SCHEDULED
Status: DISCONTINUED | OUTPATIENT
Start: 2024-10-01 | End: 2024-10-01 | Stop reason: HOSPADM

## 2024-10-01 RX ORDER — CARVEDILOL 6.25 MG/1
6.25 TABLET ORAL ONCE
Status: COMPLETED | OUTPATIENT
Start: 2024-10-01 | End: 2024-10-01

## 2024-10-01 RX ORDER — FENTANYL CITRATE 50 UG/ML
INJECTION, SOLUTION INTRAMUSCULAR; INTRAVENOUS PRN
Status: DISCONTINUED | OUTPATIENT
Start: 2024-10-01 | End: 2024-10-01 | Stop reason: HOSPADM

## 2024-10-01 RX ORDER — IOPAMIDOL 755 MG/ML
INJECTION, SOLUTION INTRAVASCULAR PRN
Status: DISCONTINUED | OUTPATIENT
Start: 2024-10-01 | End: 2024-10-01 | Stop reason: HOSPADM

## 2024-10-01 RX ORDER — ASPIRIN 325 MG
325 TABLET ORAL ONCE
Status: COMPLETED | OUTPATIENT
Start: 2024-10-01 | End: 2024-10-01

## 2024-10-01 RX ORDER — MIDAZOLAM HYDROCHLORIDE 1 MG/ML
INJECTION INTRAMUSCULAR; INTRAVENOUS PRN
Status: DISCONTINUED | OUTPATIENT
Start: 2024-10-01 | End: 2024-10-01 | Stop reason: HOSPADM

## 2024-10-01 RX ORDER — ISOSORBIDE DINITRATE 10 MG/1
20 TABLET ORAL ONCE
Status: COMPLETED | OUTPATIENT
Start: 2024-10-01 | End: 2024-10-01

## 2024-10-01 RX ORDER — SODIUM CHLORIDE 9 MG/ML
INJECTION, SOLUTION INTRAVENOUS CONTINUOUS PRN
Status: COMPLETED | OUTPATIENT
Start: 2024-10-01 | End: 2024-10-01

## 2024-10-01 RX ORDER — SODIUM CHLORIDE 9 MG/ML
INJECTION, SOLUTION INTRAVENOUS PRN
Status: DISCONTINUED | OUTPATIENT
Start: 2024-10-01 | End: 2024-10-01 | Stop reason: HOSPADM

## 2024-10-01 RX ADMIN — ASPIRIN 325 MG: 325 TABLET ORAL at 13:59

## 2024-10-01 RX ADMIN — OXYCODONE HYDROCHLORIDE AND ACETAMINOPHEN 1 TABLET: 10; 325 TABLET ORAL at 18:00

## 2024-10-01 RX ADMIN — HYDRALAZINE HYDROCHLORIDE 10 MG: 20 INJECTION, SOLUTION INTRAMUSCULAR; INTRAVENOUS at 16:56

## 2024-10-01 RX ADMIN — SODIUM CHLORIDE 50 ML/HR: 9 INJECTION, SOLUTION INTRAVENOUS at 13:59

## 2024-10-01 RX ADMIN — CARVEDILOL 6.25 MG: 6.25 TABLET, FILM COATED ORAL at 18:00

## 2024-10-01 RX ADMIN — PROTAMINE SULFATE 20 MG: 10 INJECTION, SOLUTION INTRAVENOUS at 16:52

## 2024-10-01 RX ADMIN — ISOSORBIDE DINITRATE 20 MG: 10 TABLET ORAL at 17:59

## 2024-10-01 ASSESSMENT — PAIN - FUNCTIONAL ASSESSMENT
PAIN_FUNCTIONAL_ASSESSMENT: PREVENTS OR INTERFERES WITH ALL ACTIVE AND SOME PASSIVE ACTIVITIES
PAIN_FUNCTIONAL_ASSESSMENT: PREVENTS OR INTERFERES WITH ALL ACTIVE AND SOME PASSIVE ACTIVITIES

## 2024-10-01 ASSESSMENT — PAIN DESCRIPTION - DESCRIPTORS: DESCRIPTORS: SHARP;SHOOTING

## 2024-10-01 ASSESSMENT — PAIN SCALES - GENERAL
PAINLEVEL_OUTOF10: 9
PAINLEVEL_OUTOF10: 7

## 2024-10-01 ASSESSMENT — PAIN DESCRIPTION - LOCATION: LOCATION: TOE (COMMENT WHICH ONE);LEG;FOOT

## 2024-10-01 ASSESSMENT — PAIN DESCRIPTION - ORIENTATION: ORIENTATION: RIGHT

## 2024-10-01 NOTE — PROGRESS NOTES
Patient arrival time scheduled for 11:30 with procedure start time of 1:00. Patient was called numerous times with no answer. After multiple phone calls we were able to reach the patient and she said she was told to arrive at 1:00. Previous telephone encounter documentation states she was told 11:30. We requested patient to come in as soon as possible. She verbalized  she would be here at 1:00. Patient final arrival time was 1:30.

## 2024-10-01 NOTE — PROGRESS NOTES
1345  Arrived to room, unable to bear weight, 3 person transfer to bed.  Moaning continually, right leg bent, states can't straighten.  Siderails up x2,   1515  Returned from procedure. Sheath intact to left groin.  Area soft,  1535  ACT drawn,   1630  Dozing on stretcher, groin soft.    1700  Protamine 20 mg IVP given ACT immediately after adminstration  189  Hydralazine 10 mg given for elevated BP  1715  Arterial sheath pulled from left groin.  Manual pressure held  1740  Pressure held for 25 min, hemostasis obtained. quik clot, tegederm and pressure dressing applied to left groin.    1745  Groin site unremarkable.  Repositioned in bed.  Pillow placed under right lower leg for comfort.  Complaining of pain RLE 9/10 scale   1805  Home medications given per order.  Sipping on apple juice,   1850  Dozing on stretcher, responds to name.  Groin site unremarkable.  Pressure dressing intact.  Skin warm/dry Resp unlabored, call light in reach  1915  HOB elevated 30degrees.    2000  Discharge instuctions review with ex- over the phone.  Instructed him to arrive to discharge bridge a little after 9 pm, verbalizes understanding.  2055  Assisted with dressing.  Left groin unremarkable  2100  Transferred to discharge bridge via wheelchair, transfer assist of 2 to the wheelchair.

## 2024-10-01 NOTE — DISCHARGE INSTRUCTIONS
PERIPHERAL ANGIOGRAM    Care of your puncture site:  Remove bandage 24 hours after the procedure.  May shower in 24 hours but do not sit in a bathtub/pool of water for 5 days or until the wound is healed.  Gently clean groin using soap and water.  Dry thoroughly and apply a Band-Aid that covers the entire site. Use Band-Aid until skin heals over in about 3-5 days.  Do not apply powder or lotion.      Normal Observations:  Soreness or tenderness which may last one week.  Possible bruising that could last 2 weeks.    Activity:  You may resume driving 24 hours following the procedure.  Do not make important / legal decisions within 24 hours after procedure.  You may resume normal activity in 5 days or after the wound heals.  Avoid lifting more than 10 pounds for 5 days or until the wound heals.  Avoid strenuous exercise or activity for 1 week.      Nutrition:  Regular diet  Drink at least 8 to 10 glasses of decaffeinated, non-alcoholic fluid for the next 24 hours to flush the x-ray dye used for your angiogram out of your body.    Call your doctor immediately if your condition worsens, for any other concerns, for a follow-up appointment or if you experience any of the following:  Increased swelling on the groin or leg.  Unusual pain, numbness, or tingling of the groin or down the leg.  Any signs of infection such as: redness, yellow drainage at the site, swelling or pain.    IF GROIN STARTS BLEEDING SIGNIFICANTLY:   LAY FLAT, HOLD FIRM DIRECT PRESSURE, AND CALL 911    Other Instructions:

## 2024-10-02 LAB
BACTERIA SPEC AEROBE CULT: ABNORMAL
BACTERIA SPEC ANAEROBE CULT: ABNORMAL
GRAM STN SPEC: ABNORMAL
GRAM STN SPEC: ABNORMAL
ORGANISM: ABNORMAL
POC ACT LR: 189 SEC
POC ACT LR: 255 SEC
POC ACT LR: 272 SEC
POC ACT LR: 295 SEC

## 2024-10-03 ENCOUNTER — HOSPITAL ENCOUNTER (INPATIENT)
Age: 69
LOS: 2 days | DRG: 853 | End: 2024-10-05
Attending: EMERGENCY MEDICINE | Admitting: INTERNAL MEDICINE
Payer: MEDICARE

## 2024-10-03 ENCOUNTER — APPOINTMENT (OUTPATIENT)
Dept: GENERAL RADIOLOGY | Age: 69
DRG: 853 | End: 2024-10-03
Payer: MEDICARE

## 2024-10-03 ENCOUNTER — APPOINTMENT (OUTPATIENT)
Dept: CT IMAGING | Age: 69
DRG: 853 | End: 2024-10-03
Payer: MEDICARE

## 2024-10-03 DIAGNOSIS — E87.29 RESPIRATORY ACIDOSIS: ICD-10-CM

## 2024-10-03 DIAGNOSIS — Z91.199 MEDICALLY NONCOMPLIANT: Primary | ICD-10-CM

## 2024-10-03 DIAGNOSIS — E83.51 HYPOCALCEMIA: ICD-10-CM

## 2024-10-03 DIAGNOSIS — R40.4 TRANSIENT ALTERATION OF AWARENESS: ICD-10-CM

## 2024-10-03 DIAGNOSIS — J18.9 PNEUMONIA OF RIGHT LOWER LOBE DUE TO INFECTIOUS ORGANISM: ICD-10-CM

## 2024-10-03 DIAGNOSIS — R79.89 ELEVATED TROPONIN: ICD-10-CM

## 2024-10-03 DIAGNOSIS — J90 PLEURAL EFFUSION ON RIGHT: ICD-10-CM

## 2024-10-03 DIAGNOSIS — E16.2 HYPOGLYCEMIA: ICD-10-CM

## 2024-10-03 DIAGNOSIS — R94.31 ACUTE ELECTROCARDIOGRAM CHANGES: ICD-10-CM

## 2024-10-03 DIAGNOSIS — N18.6 END-STAGE RENAL DISEASE NEEDING DIALYSIS (HCC): ICD-10-CM

## 2024-10-03 DIAGNOSIS — Z99.2 END-STAGE RENAL DISEASE NEEDING DIALYSIS (HCC): ICD-10-CM

## 2024-10-03 DIAGNOSIS — E87.5 HYPERKALEMIA: ICD-10-CM

## 2024-10-03 PROBLEM — R41.82 AMS (ALTERED MENTAL STATUS): Status: ACTIVE | Noted: 2024-10-03

## 2024-10-03 LAB
ACANTHOCYTES BLD QL SMEAR: ABNORMAL
ACANTHOCYTES BLD QL SMEAR: ABNORMAL
ALBUMIN SERPL-MCNC: 3.2 G/DL (ref 3.4–5)
ALP SERPL-CCNC: 87 U/L (ref 40–129)
ALT SERPL-CCNC: 8 U/L (ref 10–40)
ANION GAP SERPL CALCULATED.3IONS-SCNC: 19 MMOL/L (ref 3–16)
ANISOCYTOSIS BLD QL SMEAR: ABNORMAL
ANISOCYTOSIS BLD QL SMEAR: ABNORMAL
AST SERPL-CCNC: 29 U/L (ref 15–37)
BASE EXCESS BLDA CALC-SCNC: -11 MMOL/L (ref -3–3)
BASE EXCESS BLDV CALC-SCNC: -8.1 MMOL/L
BASOPHILS # BLD: 0 K/UL (ref 0–0.2)
BASOPHILS # BLD: 0 K/UL (ref 0–0.2)
BASOPHILS NFR BLD: 0 %
BASOPHILS NFR BLD: 0 %
BILIRUB DIRECT SERPL-MCNC: 0.5 MG/DL (ref 0–0.3)
BILIRUB INDIRECT SERPL-MCNC: 0.4 MG/DL (ref 0–1)
BILIRUB SERPL-MCNC: 0.9 MG/DL (ref 0–1)
BUN SERPL-MCNC: 73 MG/DL (ref 7–20)
BURR CELLS BLD QL SMEAR: ABNORMAL
CALCIUM SERPL-MCNC: 7.6 MG/DL (ref 8.3–10.6)
CHLORIDE SERPL-SCNC: 94 MMOL/L (ref 99–110)
CO2 BLDA-SCNC: 16.8 MMOL/L
CO2 BLDV-SCNC: 22 MMOL/L
CO2 SERPL-SCNC: 17 MMOL/L (ref 21–32)
COHGB MFR BLDA: 1.2 % (ref 0–1.5)
COHGB MFR BLDV: 2.2 %
CREAT SERPL-MCNC: 6.3 MG/DL (ref 0.6–1.2)
DACRYOCYTES BLD QL SMEAR: ABNORMAL
DACRYOCYTES BLD QL SMEAR: ABNORMAL
DEPRECATED RDW RBC AUTO: 23 % (ref 12.4–15.4)
DEPRECATED RDW RBC AUTO: 23.6 % (ref 12.4–15.4)
DOHLE BOD BLD QL SMEAR: PRESENT
DOHLE BOD BLD QL SMEAR: PRESENT
EKG ATRIAL RATE: 89 BPM
EKG DIAGNOSIS: NORMAL
EKG P AXIS: 45 DEGREES
EKG P-R INTERVAL: 178 MS
EKG Q-T INTERVAL: 388 MS
EKG QRS DURATION: 98 MS
EKG QTC CALCULATION (BAZETT): 472 MS
EKG R AXIS: -72 DEGREES
EKG T AXIS: 101 DEGREES
EKG VENTRICULAR RATE: 89 BPM
EOSINOPHIL # BLD: 0 K/UL (ref 0–0.6)
EOSINOPHIL # BLD: 0 K/UL (ref 0–0.6)
EOSINOPHIL NFR BLD: 0 %
EOSINOPHIL NFR BLD: 1 %
GFR SERPLBLD CREATININE-BSD FMLA CKD-EPI: 7 ML/MIN/{1.73_M2}
GLUCOSE BLD-MCNC: 104 MG/DL (ref 70–99)
GLUCOSE BLD-MCNC: 108 MG/DL (ref 70–99)
GLUCOSE BLD-MCNC: 148 MG/DL (ref 70–99)
GLUCOSE BLD-MCNC: 187 MG/DL (ref 70–99)
GLUCOSE BLD-MCNC: 220 MG/DL (ref 70–99)
GLUCOSE BLD-MCNC: 27 MG/DL (ref 70–99)
GLUCOSE BLD-MCNC: 40 MG/DL (ref 70–99)
GLUCOSE BLD-MCNC: 77 MG/DL (ref 70–99)
GLUCOSE BLD-MCNC: 78 MG/DL (ref 70–99)
GLUCOSE BLD-MCNC: 88 MG/DL (ref 70–99)
GLUCOSE SERPL-MCNC: 20 MG/DL (ref 70–99)
HCO3 BLDA-SCNC: 15.6 MMOL/L (ref 21–29)
HCO3 BLDV-SCNC: 20 MMOL/L (ref 23–29)
HCT VFR BLD AUTO: 31.9 % (ref 36–48)
HCT VFR BLD AUTO: 33.4 % (ref 36–48)
HGB BLD-MCNC: 10.9 G/DL (ref 12–16)
HGB BLD-MCNC: 9.9 G/DL (ref 12–16)
HGB BLDA-MCNC: 10.4 G/DL (ref 12–16)
HYPOCHROMIA BLD QL SMEAR: ABNORMAL
HYPOCHROMIA BLD QL SMEAR: ABNORMAL
INR PPP: 1.53 (ref 0.85–1.15)
LACTATE BLDV-SCNC: 1.6 MMOL/L (ref 0.4–2)
LACTATE BLDV-SCNC: 4.8 MMOL/L (ref 0.4–2)
LIPASE SERPL-CCNC: 7 U/L (ref 13–60)
LYMPHOCYTES # BLD: 0.2 K/UL (ref 1–5.1)
LYMPHOCYTES # BLD: 0.3 K/UL (ref 1–5.1)
LYMPHOCYTES NFR BLD: 3 %
LYMPHOCYTES NFR BLD: 7 %
MACROCYTES BLD QL SMEAR: ABNORMAL
MAGNESIUM SERPL-MCNC: 2.09 MG/DL (ref 1.8–2.4)
MCH RBC QN AUTO: 25.5 PG (ref 26–34)
MCH RBC QN AUTO: 27 PG (ref 26–34)
MCHC RBC AUTO-ENTMCNC: 31.2 G/DL (ref 31–36)
MCHC RBC AUTO-ENTMCNC: 32.8 G/DL (ref 31–36)
MCV RBC AUTO: 81.8 FL (ref 80–100)
MCV RBC AUTO: 82.4 FL (ref 80–100)
METAMYELOCYTES NFR BLD MANUAL: 2 %
METAMYELOCYTES NFR BLD MANUAL: 4 %
METHGB MFR BLDA: 0.6 %
METHGB MFR BLDV: 0.9 %
MONOCYTES # BLD: 0.1 K/UL (ref 0–1.3)
MONOCYTES # BLD: 0.5 K/UL (ref 0–1.3)
MONOCYTES NFR BLD: 3 %
MONOCYTES NFR BLD: 8 %
MYELOCYTES NFR BLD MANUAL: 5 %
NEUTROPHILS # BLD: 3.4 K/UL (ref 1.7–7.7)
NEUTROPHILS # BLD: 5.2 K/UL (ref 1.7–7.7)
NEUTROPHILS NFR BLD: 36 %
NEUTROPHILS NFR BLD: 42 %
NEUTS BAND NFR BLD MANUAL: 44 % (ref 0–7)
NEUTS BAND NFR BLD MANUAL: 45 % (ref 0–7)
NEUTS VAC BLD QL SMEAR: PRESENT
NEUTS VAC BLD QL SMEAR: PRESENT
NRBC BLD-RTO: 3 /100 WBC
NRBC BLD-RTO: 7 /100 WBC
O2 THERAPY: ABNORMAL
O2 THERAPY: ABNORMAL
OVALOCYTES BLD QL SMEAR: ABNORMAL
OVALOCYTES BLD QL SMEAR: ABNORMAL
PCO2 BLDA: 36.7 MMHG (ref 35–45)
PCO2 BLDV: 54.2 MMHG (ref 40–50)
PERFORMED ON: ABNORMAL
PERFORMED ON: NORMAL
PH BLDA: 7.24 [PH] (ref 7.35–7.45)
PH BLDV: 7.18 [PH] (ref 7.35–7.45)
PHOSPHATE SERPL-MCNC: 7.1 MG/DL (ref 2.5–4.9)
PLATELET # BLD AUTO: 226 K/UL (ref 135–450)
PLATELET # BLD AUTO: 297 K/UL (ref 135–450)
PLATELET BLD QL SMEAR: ADEQUATE
PLATELET BLD QL SMEAR: ADEQUATE
PMV BLD AUTO: 7.3 FL (ref 5–10.5)
PMV BLD AUTO: 7.3 FL (ref 5–10.5)
PO2 BLDA: 213 MMHG (ref 75–108)
PO2 BLDV: 39 MMHG
POIKILOCYTOSIS BLD QL SMEAR: ABNORMAL
POIKILOCYTOSIS BLD QL SMEAR: ABNORMAL
POLYCHROMASIA BLD QL SMEAR: ABNORMAL
POLYCHROMASIA BLD QL SMEAR: ABNORMAL
POTASSIUM SERPL-SCNC: 5.2 MMOL/L (ref 3.5–5.1)
PROCALCITONIN SERPL IA-MCNC: 68.9 NG/ML (ref 0–0.15)
PROT SERPL-MCNC: 5.7 G/DL (ref 6.4–8.2)
PROTHROMBIN TIME: 18.6 SEC (ref 11.9–14.9)
RBC # BLD AUTO: 3.9 M/UL (ref 4–5.2)
RBC # BLD AUTO: 4.05 M/UL (ref 4–5.2)
SAO2 % BLDA: 99.4 %
SAO2 % BLDV: 57 %
SCHISTOCYTES BLD QL SMEAR: ABNORMAL
SCHISTOCYTES BLD QL SMEAR: ABNORMAL
SLIDE REVIEW: ABNORMAL
SLIDE REVIEW: ABNORMAL
SODIUM SERPL-SCNC: 130 MMOL/L (ref 136–145)
TARGETS BLD QL SMEAR: ABNORMAL
TROPONIN, HIGH SENSITIVITY: 466 NG/L (ref 0–14)
TROPONIN, HIGH SENSITIVITY: 478 NG/L (ref 0–14)
TROPONIN, HIGH SENSITIVITY: 492 NG/L (ref 0–14)
WBC # BLD AUTO: 3.8 K/UL (ref 4–11)
WBC # BLD AUTO: 5.8 K/UL (ref 4–11)

## 2024-10-03 PROCEDURE — 6370000000 HC RX 637 (ALT 250 FOR IP): Performed by: INTERNAL MEDICINE

## 2024-10-03 PROCEDURE — 5A1945Z RESPIRATORY VENTILATION, 24-96 CONSECUTIVE HOURS: ICD-10-PCS

## 2024-10-03 PROCEDURE — 36600 WITHDRAWAL OF ARTERIAL BLOOD: CPT

## 2024-10-03 PROCEDURE — 1200000000 HC SEMI PRIVATE

## 2024-10-03 PROCEDURE — 96375 TX/PRO/DX INJ NEW DRUG ADDON: CPT

## 2024-10-03 PROCEDURE — 2580000003 HC RX 258: Performed by: EMERGENCY MEDICINE

## 2024-10-03 PROCEDURE — 5A1D70Z PERFORMANCE OF URINARY FILTRATION, INTERMITTENT, LESS THAN 6 HOURS PER DAY: ICD-10-PCS

## 2024-10-03 PROCEDURE — 37799 UNLISTED PX VASCULAR SURGERY: CPT

## 2024-10-03 PROCEDURE — 71045 X-RAY EXAM CHEST 1 VIEW: CPT

## 2024-10-03 PROCEDURE — 84100 ASSAY OF PHOSPHORUS: CPT

## 2024-10-03 PROCEDURE — 6360000002 HC RX W HCPCS: Performed by: EMERGENCY MEDICINE

## 2024-10-03 PROCEDURE — 36620 INSERTION CATHETER ARTERY: CPT

## 2024-10-03 PROCEDURE — 80048 BASIC METABOLIC PNL TOTAL CA: CPT

## 2024-10-03 PROCEDURE — 2000000000 HC ICU R&B

## 2024-10-03 PROCEDURE — 99285 EMERGENCY DEPT VISIT HI MDM: CPT

## 2024-10-03 PROCEDURE — 85025 COMPLETE CBC W/AUTO DIFF WBC: CPT

## 2024-10-03 PROCEDURE — 93005 ELECTROCARDIOGRAM TRACING: CPT | Performed by: EMERGENCY MEDICINE

## 2024-10-03 PROCEDURE — 96361 HYDRATE IV INFUSION ADD-ON: CPT

## 2024-10-03 PROCEDURE — 36415 COLL VENOUS BLD VENIPUNCTURE: CPT

## 2024-10-03 PROCEDURE — 03HY32Z INSERTION OF MONITORING DEVICE INTO UPPER ARTERY, PERCUTANEOUS APPROACH: ICD-10-PCS | Performed by: INTERNAL MEDICINE

## 2024-10-03 PROCEDURE — 2500000003 HC RX 250 WO HCPCS: Performed by: EMERGENCY MEDICINE

## 2024-10-03 PROCEDURE — 6360000002 HC RX W HCPCS

## 2024-10-03 PROCEDURE — 0BH17EZ INSERTION OF ENDOTRACHEAL AIRWAY INTO TRACHEA, VIA NATURAL OR ARTIFICIAL OPENING: ICD-10-PCS

## 2024-10-03 PROCEDURE — 70450 CT HEAD/BRAIN W/O DYE: CPT

## 2024-10-03 PROCEDURE — 6360000002 HC RX W HCPCS: Performed by: STUDENT IN AN ORGANIZED HEALTH CARE EDUCATION/TRAINING PROGRAM

## 2024-10-03 PROCEDURE — 2580000003 HC RX 258: Performed by: STUDENT IN AN ORGANIZED HEALTH CARE EDUCATION/TRAINING PROGRAM

## 2024-10-03 PROCEDURE — 6370000000 HC RX 637 (ALT 250 FOR IP): Performed by: EMERGENCY MEDICINE

## 2024-10-03 PROCEDURE — 87186 SC STD MICRODIL/AGAR DIL: CPT

## 2024-10-03 PROCEDURE — 2580000003 HC RX 258: Performed by: INTERNAL MEDICINE

## 2024-10-03 PROCEDURE — 93010 ELECTROCARDIOGRAM REPORT: CPT | Performed by: INTERNAL MEDICINE

## 2024-10-03 PROCEDURE — 83735 ASSAY OF MAGNESIUM: CPT

## 2024-10-03 PROCEDURE — 02HV33Z INSERTION OF INFUSION DEVICE INTO SUPERIOR VENA CAVA, PERCUTANEOUS APPROACH: ICD-10-PCS | Performed by: INTERNAL MEDICINE

## 2024-10-03 PROCEDURE — 94002 VENT MGMT INPAT INIT DAY: CPT

## 2024-10-03 PROCEDURE — 2580000003 HC RX 258

## 2024-10-03 PROCEDURE — 80076 HEPATIC FUNCTION PANEL: CPT

## 2024-10-03 PROCEDURE — 96365 THER/PROPH/DIAG IV INF INIT: CPT

## 2024-10-03 PROCEDURE — 94640 AIRWAY INHALATION TREATMENT: CPT

## 2024-10-03 PROCEDURE — 03HB33Z INSERTION OF INFUSION DEVICE INTO RIGHT RADIAL ARTERY, PERCUTANEOUS APPROACH: ICD-10-PCS | Performed by: INTERNAL MEDICINE

## 2024-10-03 PROCEDURE — 3E033XZ INTRODUCTION OF VASOPRESSOR INTO PERIPHERAL VEIN, PERCUTANEOUS APPROACH: ICD-10-PCS | Performed by: INTERNAL MEDICINE

## 2024-10-03 PROCEDURE — 83690 ASSAY OF LIPASE: CPT

## 2024-10-03 PROCEDURE — 94760 N-INVAS EAR/PLS OXIMETRY 1: CPT

## 2024-10-03 PROCEDURE — 0BH17EZ INSERTION OF ENDOTRACHEAL AIRWAY INTO TRACHEA, VIA NATURAL OR ARTIFICIAL OPENING: ICD-10-PCS | Performed by: INTERNAL MEDICINE

## 2024-10-03 PROCEDURE — 90935 HEMODIALYSIS ONE EVALUATION: CPT

## 2024-10-03 PROCEDURE — 2500000003 HC RX 250 WO HCPCS

## 2024-10-03 PROCEDURE — 93005 ELECTROCARDIOGRAM TRACING: CPT | Performed by: INTERNAL MEDICINE

## 2024-10-03 PROCEDURE — 02HV33Z INSERTION OF INFUSION DEVICE INTO SUPERIOR VENA CAVA, PERCUTANEOUS APPROACH: ICD-10-PCS

## 2024-10-03 PROCEDURE — 2500000003 HC RX 250 WO HCPCS: Performed by: INTERNAL MEDICINE

## 2024-10-03 PROCEDURE — 87150 DNA/RNA AMPLIFIED PROBE: CPT

## 2024-10-03 PROCEDURE — 83605 ASSAY OF LACTIC ACID: CPT

## 2024-10-03 PROCEDURE — 85610 PROTHROMBIN TIME: CPT

## 2024-10-03 PROCEDURE — 5A1D90Z PERFORMANCE OF URINARY FILTRATION, CONTINUOUS, GREATER THAN 18 HOURS PER DAY: ICD-10-PCS | Performed by: INTERNAL MEDICINE

## 2024-10-03 PROCEDURE — 5A1D70Z PERFORMANCE OF URINARY FILTRATION, INTERMITTENT, LESS THAN 6 HOURS PER DAY: ICD-10-PCS | Performed by: INTERNAL MEDICINE

## 2024-10-03 PROCEDURE — 3E033XZ INTRODUCTION OF VASOPRESSOR INTO PERIPHERAL VEIN, PERCUTANEOUS APPROACH: ICD-10-PCS

## 2024-10-03 PROCEDURE — 87040 BLOOD CULTURE FOR BACTERIA: CPT

## 2024-10-03 PROCEDURE — 84145 PROCALCITONIN (PCT): CPT

## 2024-10-03 PROCEDURE — 82803 BLOOD GASES ANY COMBINATION: CPT

## 2024-10-03 PROCEDURE — 5A1945Z RESPIRATORY VENTILATION, 24-96 CONSECUTIVE HOURS: ICD-10-PCS | Performed by: INTERNAL MEDICINE

## 2024-10-03 PROCEDURE — 96368 THER/DIAG CONCURRENT INF: CPT

## 2024-10-03 PROCEDURE — 96367 TX/PROPH/DG ADDL SEQ IV INF: CPT

## 2024-10-03 PROCEDURE — 84484 ASSAY OF TROPONIN QUANT: CPT

## 2024-10-03 RX ORDER — POTASSIUM CHLORIDE 29.8 MG/ML
20 INJECTION INTRAVENOUS PRN
Status: DISCONTINUED | OUTPATIENT
Start: 2024-10-03 | End: 2024-10-05 | Stop reason: HOSPADM

## 2024-10-03 RX ORDER — DEXTROSE MONOHYDRATE 25 G/50ML
INJECTION, SOLUTION INTRAVENOUS
Status: COMPLETED
Start: 2024-10-03 | End: 2024-10-03

## 2024-10-03 RX ORDER — ISOSORBIDE MONONITRATE 30 MG/1
30 TABLET, EXTENDED RELEASE ORAL DAILY
Status: DISCONTINUED | OUTPATIENT
Start: 2024-10-04 | End: 2024-10-05 | Stop reason: HOSPADM

## 2024-10-03 RX ORDER — HEPARIN SODIUM 1000 [USP'U]/ML
INJECTION, SOLUTION INTRAVENOUS; SUBCUTANEOUS PRN
Status: DISCONTINUED | OUTPATIENT
Start: 2024-10-03 | End: 2024-10-04

## 2024-10-03 RX ORDER — ACETAMINOPHEN 500 MG
500 TABLET ORAL EVERY 6 HOURS PRN
Status: DISCONTINUED | OUTPATIENT
Start: 2024-10-03 | End: 2024-10-05 | Stop reason: HOSPADM

## 2024-10-03 RX ORDER — INSULIN LISPRO 100 [IU]/ML
0-4 INJECTION, SOLUTION INTRAVENOUS; SUBCUTANEOUS NIGHTLY
Status: DISCONTINUED | OUTPATIENT
Start: 2024-10-03 | End: 2024-10-05 | Stop reason: HOSPADM

## 2024-10-03 RX ORDER — CLOPIDOGREL BISULFATE 75 MG/1
75 TABLET ORAL DAILY
Status: DISCONTINUED | OUTPATIENT
Start: 2024-10-04 | End: 2024-10-05 | Stop reason: HOSPADM

## 2024-10-03 RX ORDER — LEVOTHYROXINE SODIUM 25 UG/1
25 TABLET ORAL DAILY
Status: DISCONTINUED | OUTPATIENT
Start: 2024-10-04 | End: 2024-10-05 | Stop reason: HOSPADM

## 2024-10-03 RX ORDER — SODIUM CHLORIDE 9 MG/ML
INJECTION, SOLUTION INTRAVENOUS PRN
Status: DISCONTINUED | OUTPATIENT
Start: 2024-10-03 | End: 2024-10-05 | Stop reason: HOSPADM

## 2024-10-03 RX ORDER — PROPOFOL 10 MG/ML
5-50 INJECTION, EMULSION INTRAVENOUS CONTINUOUS
Status: DISCONTINUED | OUTPATIENT
Start: 2024-10-03 | End: 2024-10-05 | Stop reason: HOSPADM

## 2024-10-03 RX ORDER — DEXTROSE MONOHYDRATE 25 G/50ML
25 INJECTION, SOLUTION INTRAVENOUS ONCE
Status: COMPLETED | OUTPATIENT
Start: 2024-10-03 | End: 2024-10-03

## 2024-10-03 RX ORDER — LIDOCAINE HYDROCHLORIDE 10 MG/ML
50 INJECTION, SOLUTION EPIDURAL; INFILTRATION; INTRACAUDAL; PERINEURAL ONCE
Status: DISCONTINUED | OUTPATIENT
Start: 2024-10-03 | End: 2024-10-05 | Stop reason: HOSPADM

## 2024-10-03 RX ORDER — FLUTICASONE PROPIONATE 50 MCG
1 SPRAY, SUSPENSION (ML) NASAL DAILY PRN
Status: DISCONTINUED | OUTPATIENT
Start: 2024-10-03 | End: 2024-10-05 | Stop reason: HOSPADM

## 2024-10-03 RX ORDER — ASPIRIN 81 MG/1
81 TABLET, CHEWABLE ORAL DAILY
Status: DISCONTINUED | OUTPATIENT
Start: 2024-10-04 | End: 2024-10-05 | Stop reason: HOSPADM

## 2024-10-03 RX ORDER — MAGNESIUM SULFATE 1 G/100ML
1000 INJECTION INTRAVENOUS PRN
Status: DISCONTINUED | OUTPATIENT
Start: 2024-10-03 | End: 2024-10-05 | Stop reason: HOSPADM

## 2024-10-03 RX ORDER — 0.9 % SODIUM CHLORIDE 0.9 %
500 INTRAVENOUS SOLUTION INTRAVENOUS ONCE
Status: COMPLETED | OUTPATIENT
Start: 2024-10-03 | End: 2024-10-03

## 2024-10-03 RX ORDER — SODIUM CHLORIDE 0.9 % (FLUSH) 0.9 %
5-40 SYRINGE (ML) INJECTION PRN
Status: DISCONTINUED | OUTPATIENT
Start: 2024-10-03 | End: 2024-10-05 | Stop reason: HOSPADM

## 2024-10-03 RX ORDER — CALCIUM GLUCONATE 20 MG/ML
2000 INJECTION, SOLUTION INTRAVENOUS PRN
Status: DISCONTINUED | OUTPATIENT
Start: 2024-10-03 | End: 2024-10-05 | Stop reason: HOSPADM

## 2024-10-03 RX ORDER — GABAPENTIN 300 MG/1
300 CAPSULE ORAL
Status: DISCONTINUED | OUTPATIENT
Start: 2024-10-03 | End: 2024-10-05 | Stop reason: HOSPADM

## 2024-10-03 RX ORDER — HEPARIN SODIUM 5000 [USP'U]/ML
5000 INJECTION, SOLUTION INTRAVENOUS; SUBCUTANEOUS EVERY 8 HOURS SCHEDULED
Status: DISCONTINUED | OUTPATIENT
Start: 2024-10-03 | End: 2024-10-05 | Stop reason: HOSPADM

## 2024-10-03 RX ORDER — BUDESONIDE AND FORMOTEROL FUMARATE DIHYDRATE 160; 4.5 UG/1; UG/1
2 AEROSOL RESPIRATORY (INHALATION)
Status: DISCONTINUED | OUTPATIENT
Start: 2024-10-03 | End: 2024-10-05 | Stop reason: HOSPADM

## 2024-10-03 RX ORDER — CALCITRIOL 0.25 UG/1
2.25 CAPSULE, LIQUID FILLED ORAL
Status: DISCONTINUED | OUTPATIENT
Start: 2024-10-04 | End: 2024-10-05 | Stop reason: HOSPADM

## 2024-10-03 RX ORDER — 0.9 % SODIUM CHLORIDE 0.9 %
1000 INTRAVENOUS SOLUTION INTRAVENOUS ONCE
Status: COMPLETED | OUTPATIENT
Start: 2024-10-03 | End: 2024-10-03

## 2024-10-03 RX ORDER — IPRATROPIUM BROMIDE AND ALBUTEROL SULFATE 2.5; .5 MG/3ML; MG/3ML
1 SOLUTION RESPIRATORY (INHALATION) EVERY 4 HOURS PRN
Status: DISCONTINUED | OUTPATIENT
Start: 2024-10-03 | End: 2024-10-05 | Stop reason: HOSPADM

## 2024-10-03 RX ORDER — LEVOTHYROXINE SODIUM 100 UG/1
200 TABLET ORAL DAILY
Status: DISCONTINUED | OUTPATIENT
Start: 2024-10-04 | End: 2024-10-05 | Stop reason: HOSPADM

## 2024-10-03 RX ORDER — DEXTROSE MONOHYDRATE AND SODIUM CHLORIDE 5; .9 G/100ML; G/100ML
INJECTION, SOLUTION INTRAVENOUS CONTINUOUS
Status: DISCONTINUED | OUTPATIENT
Start: 2024-10-03 | End: 2024-10-04

## 2024-10-03 RX ORDER — ATORVASTATIN CALCIUM 40 MG/1
40 TABLET, FILM COATED ORAL DAILY
Status: DISCONTINUED | OUTPATIENT
Start: 2024-10-04 | End: 2024-10-05 | Stop reason: HOSPADM

## 2024-10-03 RX ORDER — DEXTROSE MONOHYDRATE 100 MG/ML
INJECTION, SOLUTION INTRAVENOUS CONTINUOUS PRN
Status: DISCONTINUED | OUTPATIENT
Start: 2024-10-03 | End: 2024-10-05 | Stop reason: HOSPADM

## 2024-10-03 RX ORDER — HEPARIN SODIUM 1000 [USP'U]/ML
INJECTION, SOLUTION INTRAVENOUS; SUBCUTANEOUS PRN
Status: DISCONTINUED | OUTPATIENT
Start: 2024-10-03 | End: 2024-10-03 | Stop reason: SDUPTHER

## 2024-10-03 RX ORDER — INSULIN LISPRO 100 [IU]/ML
0-4 INJECTION, SOLUTION INTRAVENOUS; SUBCUTANEOUS
Status: DISCONTINUED | OUTPATIENT
Start: 2024-10-04 | End: 2024-10-05 | Stop reason: HOSPADM

## 2024-10-03 RX ORDER — NOREPINEPHRINE BITARTRATE 0.06 MG/ML
1-100 INJECTION, SOLUTION INTRAVENOUS CONTINUOUS
Status: DISCONTINUED | OUTPATIENT
Start: 2024-10-03 | End: 2024-10-05 | Stop reason: HOSPADM

## 2024-10-03 RX ORDER — CARVEDILOL 6.25 MG/1
6.25 TABLET ORAL 2 TIMES DAILY WITH MEALS
Status: DISCONTINUED | OUTPATIENT
Start: 2024-10-03 | End: 2024-10-05 | Stop reason: HOSPADM

## 2024-10-03 RX ORDER — PROPOFOL 10 MG/ML
INJECTION, EMULSION INTRAVENOUS
Status: COMPLETED
Start: 2024-10-03 | End: 2024-10-03

## 2024-10-03 RX ORDER — CALCIUM GLUCONATE 20 MG/ML
1000 INJECTION, SOLUTION INTRAVENOUS PRN
Status: DISCONTINUED | OUTPATIENT
Start: 2024-10-03 | End: 2024-10-05 | Stop reason: HOSPADM

## 2024-10-03 RX ORDER — SODIUM CHLORIDE 0.9 % (FLUSH) 0.9 %
5-40 SYRINGE (ML) INJECTION EVERY 12 HOURS SCHEDULED
Status: DISCONTINUED | OUTPATIENT
Start: 2024-10-03 | End: 2024-10-05 | Stop reason: HOSPADM

## 2024-10-03 RX ORDER — BISACODYL 5 MG/1
5 TABLET, DELAYED RELEASE ORAL DAILY PRN
Status: DISCONTINUED | OUTPATIENT
Start: 2024-10-03 | End: 2024-10-05 | Stop reason: HOSPADM

## 2024-10-03 RX ORDER — OXYCODONE AND ACETAMINOPHEN 10; 325 MG/1; MG/1
1 TABLET ORAL EVERY 6 HOURS PRN
Status: DISCONTINUED | OUTPATIENT
Start: 2024-10-03 | End: 2024-10-05 | Stop reason: HOSPADM

## 2024-10-03 RX ORDER — GLUCAGON 1 MG/ML
1 KIT INJECTION PRN
Status: DISCONTINUED | OUTPATIENT
Start: 2024-10-03 | End: 2024-10-05 | Stop reason: HOSPADM

## 2024-10-03 RX ORDER — OXYCODONE AND ACETAMINOPHEN 10; 325 MG/1; MG/1
1 TABLET ORAL ONCE
Status: COMPLETED | OUTPATIENT
Start: 2024-10-03 | End: 2024-10-03

## 2024-10-03 RX ORDER — PANTOPRAZOLE SODIUM 40 MG/1
40 TABLET, DELAYED RELEASE ORAL
Status: DISCONTINUED | OUTPATIENT
Start: 2024-10-04 | End: 2024-10-05 | Stop reason: HOSPADM

## 2024-10-03 RX ORDER — DEXTROSE MONOHYDRATE AND SODIUM CHLORIDE 5; .45 G/100ML; G/100ML
INJECTION, SOLUTION INTRAVENOUS CONTINUOUS
Status: DISCONTINUED | OUTPATIENT
Start: 2024-10-03 | End: 2024-10-03 | Stop reason: ALTCHOICE

## 2024-10-03 RX ADMIN — Medication 2 PUFF: at 20:27

## 2024-10-03 RX ADMIN — HEPARIN SODIUM 25 ML/HR: 5000 INJECTION INTRAVENOUS; SUBCUTANEOUS at 23:00

## 2024-10-03 RX ADMIN — PROPOFOL 20 MCG/KG/MIN: 10 INJECTION, EMULSION INTRAVENOUS at 18:51

## 2024-10-03 RX ADMIN — Medication: at 23:06

## 2024-10-03 RX ADMIN — DEXTROSE MONOHYDRATE 125 ML: 100 INJECTION, SOLUTION INTRAVENOUS at 20:48

## 2024-10-03 RX ADMIN — DEXTROSE AND SODIUM CHLORIDE: 5; 900 INJECTION, SOLUTION INTRAVENOUS at 20:33

## 2024-10-03 RX ADMIN — SODIUM CHLORIDE 500 ML: 9 INJECTION, SOLUTION INTRAVENOUS at 13:53

## 2024-10-03 RX ADMIN — DEXTROSE AND SODIUM CHLORIDE: 5; 450 INJECTION, SOLUTION INTRAVENOUS at 15:15

## 2024-10-03 RX ADMIN — Medication: at 20:23

## 2024-10-03 RX ADMIN — OXYCODONE AND ACETAMINOPHEN 1 TABLET: 10; 325 TABLET ORAL at 14:56

## 2024-10-03 RX ADMIN — SODIUM BICARBONATE 50 MEQ: 84 INJECTION INTRAVENOUS at 14:20

## 2024-10-03 RX ADMIN — CEFEPIME 1000 MG: 1 INJECTION, POWDER, FOR SOLUTION INTRAMUSCULAR; INTRAVENOUS at 14:55

## 2024-10-03 RX ADMIN — VASOPRESSIN 0.01 UNITS/MIN: 20 INJECTION INTRAVENOUS at 20:36

## 2024-10-03 RX ADMIN — VANCOMYCIN HYDROCHLORIDE 1000 MG: 1 INJECTION, POWDER, LYOPHILIZED, FOR SOLUTION INTRAVENOUS at 15:56

## 2024-10-03 RX ADMIN — HEPARIN SODIUM 25 ML/HR: 5000 INJECTION INTRAVENOUS; SUBCUTANEOUS at 23:02

## 2024-10-03 RX ADMIN — DEXTROSE MONOHYDRATE 50 ML: 25 INJECTION, SOLUTION INTRAVENOUS at 13:01

## 2024-10-03 RX ADMIN — DEXTROSE MONOHYDRATE 25 G: 25 INJECTION, SOLUTION INTRAVENOUS at 15:44

## 2024-10-03 RX ADMIN — Medication 5 MCG/MIN: at 18:42

## 2024-10-03 RX ADMIN — SODIUM CHLORIDE 1000 ML: 9 INJECTION, SOLUTION INTRAVENOUS at 17:49

## 2024-10-03 ASSESSMENT — PULMONARY FUNCTION TESTS
PIF_VALUE: 23
PIF_VALUE: 24
PIF_VALUE: 26
PIF_VALUE: 24
PIF_VALUE: 25
PIF_VALUE: 26
PIF_VALUE: 22
PIF_VALUE: 23
PIF_VALUE: 22
PIF_VALUE: 23
PIF_VALUE: 22
PIF_VALUE: 24
PIF_VALUE: 23
PIF_VALUE: 24
PIF_VALUE: 24
PIF_VALUE: 25
PIF_VALUE: 22
PIF_VALUE: 24
PIF_VALUE: 23
PIF_VALUE: 25
PIF_VALUE: 24
PIF_VALUE: 23

## 2024-10-03 ASSESSMENT — PAIN SCALES - GENERAL: PAINLEVEL_OUTOF10: 0

## 2024-10-03 NOTE — CONSULTS
Patient ID: Shanna Mejia  Referring/ Physician: Sona Thomas MD      Summary:   Shanna Mejia is being seen by nephrology for ESRD.     Reason for admission: altered mental status      HPI:  Shanna Mejia is a 68 y.o. female with  has a past medical history of Carotid artery stenosis, CHF (congestive heart failure) (HCC), CKD (chronic kidney disease), Coronary artery disease, Diabetes mellitus (HCC), Dilated cardiomyopathy (HCC), Gout, Hemodialysis patient (HCC), Hyperlipidemia, Hypertension, Hypothyroidism, Ischemic stroke (HCC), Lung nodule, Neuropathy, Osteopenia, Peripheral arterial disease (HCC), Peripheral neuropathy, and Tobacco use disorder.   Patient was seen and examined in the emergency room.  She is a little confused, has a lot of edema in her lower extremities peripheral vascular disease, ulcers on her feet.  She denies any shortness of breath or chest pain but she is lethargic.  Her blood pressure was low, she was given IV fluids, her chest x-ray showed a large right-sided pleural effusion that was bigger from prior imaging .   Patient went to dialysis this afternoon and had a code blue called during. She was intubated and is now in the ICU. She had hypotension and was again given small fluid bolus.  She was also hypoglycemic on admission with BG in the 20s.   She has a tunneled dialysis line for access.       Assessment/Plan:   - patient is now hypotensive and in ICU intubated  - will do CRRT for volume management. CHF present and fluid overloaded on exam.   - discussed with Dr Boucher.       ESRD  HD Monday Wednesday Friday via tunneled dialysis catheter.  She missed 2 sessions of dialysis this past week.  She is presenting with fluid overload, worsening pleural effusion and shortness of breath.      Hypoxic respiratory failure  Enlarging right-sided pleural effusion  CHF on chest x-ray  Now intubated.  Will try to optimize volume status with ultrafiltration.  Plan  09/03/2024 04:18 PM    BILIRUBINUR Negative 09/03/2024 04:18 PM

## 2024-10-03 NOTE — PROGRESS NOTES
I called Steffichris but she did not answer. I called her other daughter Malini to update on her mothers current medical concerns. She is aware that her mother was intubated and is being transferred to ICU.I asked her if she would please call other family members including Steffichris. Malini told me that she would get in touch with her sister right away.

## 2024-10-03 NOTE — ED PROVIDER NOTES
Take one tablet by mouth daily 30 tablet 11    bisacodyl (DULCOLAX) 5 MG EC tablet Take 1 tablet by mouth daily as needed for Constipation      acetaminophen (TYLENOL) 500 MG tablet Take 1 tablet by mouth every 6 hours as needed for Pain      fluticasone (FLONASE) 50 MCG/ACT nasal spray 1 spray by Each Nostril route daily as needed for Rhinitis or Allergies      albuterol sulfate  (90 Base) MCG/ACT inhaler Inhale 2 puffs into the lungs every 4 hours as needed for Wheezing or Shortness of Breath      aspirin 81 MG tablet Take 1 tablet by mouth daily         ALLERGIES  No Known Allergies    Tetanus vaccination status reviewed: tetanus re-vaccination not indicated.    PHYSICAL EXAM  VITAL SIGNS: /62   Pulse 82   Temp 98.3 °F (36.8 °C) (Oral)   Resp 16   SpO2 96%   Constitutional: Well-developed, well-nourished, appears altered and minimally conversive oriented x 1, nontoxic, activity: Lying on the cart, minimally conversive, oriented x 1  HENT: Normocephalic, Atraumatic, Bilateral external ears normal, TM's were normal, Mucus membranes are dry and oropharynx is patent and clear, No oral exudates, Nose normal, No lingual abrasions, no lingual lacerations, no lingual contusions.  Eyes:  Conjunctiva normal, No discharge. No scleral icterus.  Neck: Normal range of motion, No tenderness, Supple.  Lymphatic: No lymphadenopathy noted.  Cardiovascular: Normal heart rate, Normal rhythm, no murmurs, no gallops, no rubs.  Thorax & Lungs: Normal breath sounds, no respiratory distress, no wheezing, no rales, no rhonchi  Abdomen: Soft, Nontender, No hepatosplenomegaly, No masses, No pulsatile masses, No distension, normal bowel sounds  Skin: Warm, Dry  Extremities: No 3+ bilateral lower extremity edema, No tenderness,  no major deformities noted.  Neurologic: Alert & oriented x 1, CN II through XII are intact, generalized weakness but otherwise normal motor function, normal sensory function, no focal deficits noted,  1525)   dextrose 50 % IV solution (25 g IntraVENous Given 10/3/24 1544)       New Prescriptions    No medications on file       SEP-1 CORE MEASURE DATA  Exclusion criteria: the patient is NOT to be included for sepsis due to:  SIRS criteria are not met    Patient remained stable in the ED. Dialysis patient presents with altered mental status and blood sugar of 27.  She missed her last 2 dialysis treatments on Monday and Wednesday.  She had court on Monday and could not make it to dialysis and was not feeling well on Wednesday so did go.  She was given an amp of D50 and her glucose came up to 187.  She is more alert than when she first came.  However, her chest x-ray shows a large right pleural effusion that is increased in size since 3/10/2024 her white count is 5.8 but she had 45 bands 2 medicines, sodium 130 potassium 5.2 but hemolyzed, troponin is elevated at 492 but repeat is pending.  VBG showed a pH of 7.18, pCO2 of 54, and bicarb of 20 lactic acid is normal at 1.6, spoke to nephrology,  Dr. Mcintosh,.  Needs admitted. they may dialyze today or they may wait till tomorrow.  Hospitalist was notified at 7702.  I spoke to Dr. Landin and he stated that patient does not need ICU and that we should speak to Dr. Thomas for admission.  I reviewed the EKG with him and he still states that patient does not need ICU.  Therefore, Dr. Thomas was paged at 2945 and she stated she would admit the patient.  She would put in orders.  I reviewed the test results with her.    Diagnostic considerations include but are not limited to:    Hypoxemia/ischemic encephalopathy, hepatic encephalopathy   Seizure or postictal state   Alterations of glucose such as hypoglycemia and hyperglycemia    Alterations in perfusions such as hypotension and hypoperfusion    Alterations in electrolytes such as disturbances in sodium or calcium   Infectious processes such as sepsis from a pneumonia or urinary tract infection    Substance use or    5. End-stage renal disease needing dialysis (HCC)    6. Elevated troponin    7. Acute electrocardiogram changes    8. Hypocalcemia    9. Respiratory acidosis    10. Pneumonia of right lower lobe due to infectious organism    11. Pleural effusion on right        ?  Recheck Times: 1350, 1415, 1445, 1510  Critical Care Time: 45 minutes           Yovany Garzon DO  10/03/24 5817

## 2024-10-03 NOTE — H&P
Hospital Medicine History & Physical      PCP: Sona Thomas MD    Date of Admission: 10/3/2024    Date of Service: Pt seen/examined on 10/3/24 and Admitted to Inpatient     Chief Complaint:  AMS    History Of Present Illness:     The patient is a 68 y.o. female who presents to [facility] with AMS. Pt has a h/o ESRD on dialysis, apparently missed the last 2 dialysis sessions, was not feeling well and was more altered and hence presented to the ED. Pt was admitted by PCP Dr Thomas and nephrology was consulted, pt was started on dialysis. Was hypotensive, but as dialysis progressed became more hypotensive and less responsive and code blue was called. Pt was intubated and admitted to ICU. Received a fluid bolus and BP improved.      Past Medical History:        Diagnosis Date    Carotid artery stenosis     CHF (congestive heart failure) (HCC)     CKD (chronic kidney disease)     Coronary artery disease     Diabetes mellitus (HCC)     on no meds -[id number]    Dilated cardiomyopathy (HCC)     Gout     Hemodialysis patient (HCC)     Hyperlipidemia     Hypertension     Hypothyroidism     Ischemic stroke (HCC)     2016    Lung nodule     Neuropathy     Osteopenia     Peripheral arterial disease (HCC)     Peripheral neuropathy     Tobacco use disorder        Past Surgical History:        Procedure Laterality Date    CARDIAC CATHETERIZATION      CARDIAC PROCEDURE N/A 7/26/2024    Peripheral angiography performed by Maximo Boo MD at [facility] CARDIAC CATH LAB    CARDIAC PROCEDURE N/A 9/4/2024    Peripheral angiography performed by Armaan Mandujano MD at [facility] CARDIAC CATH LAB    CAROTID ENDARTERECTOMY Left     2017    CATARACT REMOVAL Right 05/2022    CORONARY ANGIOPLASTY WITH STENT PLACEMENT  03/2019    X 1    CYST INCISION AND DRAINAGE      right arm     DIALYSIS FISTULA CREATION Left 7/11/2024    LEFT ARM ARTERIOVENOUS FISTULA CREATION performed by Dennis Sotomayor II, MD at [facility] OR    HYSTERECTOMY (CERVIX STATUS UNKNOWN)      INTRACAPSULAR CATARACT EXTRACTION Left 07/30/2020    PHACOEMULSIFICATION WITH INTRAOCULAR LENS IMPLANT performed by Akira Reza MD at [facility]    INTRACAPSULAR CATARACT EXTRACTION Right 05/13/2022    PHACOEMULSIFICATION WITH INTRAOCULAR LENS IMPLANT - RIGHT EYE performed by Akira Reza MD at [facility] CTR    INVASIVE VASCULAR N/A 7/30/2024    Aortagram abdominal w runoff performed by Remberto Thurston MD at [facility] CARDIAC CATH LAB    INVASIVE VASCULAR N/A 9/4/2024    Angioplasty iliac performed by Armaan Mandujano MD at [facility] CARDIAC CATH LAB    INVASIVE VASCULAR N/A 9/4/2024    Intravascular lithotripsy performed by Armaan Mandujano MD at [facility] CARDIAC CATH LAB    IR TUNNELED CATHETER PLACEMENT GREATER THAN 5 YEARS Right 03/13/2024    Permacath; RIJ access; 23cm; Dr. Palencia    IR TUNNELED CATHETER PLACEMENT GREATER THAN 5 YEARS  3/13/2024    IR TUNNELED CATHETER PLACEMENT GREATER THAN 5 YEARS 3/13/2024 [facility] SPECIAL PROCEDURES    REFRACTIVE SURGERY Left 02/18/2019    EYE LASER performed by Eleno Weir MD at [facility] CTR    TUBAL LIGATION      VITRECTOMY Left 02/18/2019    25 GAUGE PARS PLANA VITRECTOMY WITH MEMBRANE PEEL AND INTERNAL LIMITING MEMBRANE PEEL, FLUID AIR EXCHANGE AND SUBTENONS KENALOG performed by Eleno Weir MD at [facility]       Medications Prior to Admission:    Prior to Admission medications    Medication Sig Start Date End Date Taking? Authorizing Provider   oxyCODONE-acetaminophen (PERCOCET)  MG per tablet Take 1 tablet by mouth every 6 hours as needed for Pain for up to 14 days. Intended supply: 30 days Max Daily Amount: 4 tablets 9/23/24 10/7/24  Sona Thomas MD   polyethylene glycol (GLYCOLAX) 17 g packet Take 1 packet by mouth daily  Patient not  taking: Reported on 10/1/2024 9/8/24 10/8/24  Sona Thomas MD   atorvastatin (LIPITOR) 40 MG tablet Take 1 tablet by mouth daily    ProviderErika MD   carvedilol (COREG) 6.25 MG tablet Take 1 tablet by mouth 2 times daily (with meals) 8/1/24   Sona Thomas MD   clopidogrel (PLAVIX) 75 MG tablet Take 1 tablet by mouth daily 8/2/24   Sona Thomas MD   sevelamer (RENVELA) 800 MG tablet Take 1 tablet by mouth 3 times daily (with meals) 8/1/24   Sona Thomas MD   gabapentin (NEURONTIN) 300 MG capsule Take 1 capsule by mouth nightly. 7/15/24   ProviderErika MD   CALCITRIOL PO Take 2.25 mcg by mouth three times a week With HD treatment    Provider, MD Erika   ipratropium 0.5 mg-albuterol 2.5 mg (DUONEB) 0.5-2.5 (3) MG/3ML SOLN nebulizer solution USE 1 VIAL VIA NEBULIZER EVERY 4 HOURS 4/12/24   Sona Thomas MD   budesonide-formoterol (SYMBICORT) 160-4.5 MCG/ACT AERO Inhale 2 puffs into the lungs 2 times daily 4/9/24   Sona Thomas MD   pantoprazole (PROTONIX) 40 MG tablet Take 1 tablet by mouth every morning (before breakfast) 3/19/24   Sona Thomas MD   isosorbide mononitrate (ISMO;MONOKET) 20 MG tablet Take one tablet by mouth twice daily 1/10/24   Sona Thomas MD   levothyroxine (SYNTHROID) 200 MCG tablet Take one tablet by mouth daily 1/10/24   Sona Thomas MD   rosuvastatin (CRESTOR) 40 MG tablet Take one tablet by mouth daily 1/10/24   Sona Thomas MD   levothyroxine (SYNTHROID) 25 MCG tablet Take one tablet by mouth daily 1/10/24   Sona Thomas MD   bisacodyl (DULCOLAX) 5 MG EC tablet Take 1 tablet by mouth daily as needed for Constipation    Provider, MD Erika   acetaminophen (TYLENOL) 500 MG tablet Take 1 tablet by mouth every 6 hours as needed for Pain    Provider, MD Erika   fluticasone (FLONASE) 50 MCG/ACT nasal spray 1 spray by Each Nostril route daily as needed for Rhinitis or Allergies    Provider,

## 2024-10-03 NOTE — ED NOTES
Face to face report given to RN emi, all questions answered at this time    Last refill  Next appt 10/12/21  Lov 6/21/21

## 2024-10-03 NOTE — PROGRESS NOTES
NAME:  Shanna Mejia  YOB: 1955  MEDICAL RECORD NUMBER:  [id number]    Shift Summary: see notes, patient to dialysis from ED, fluid overload and missed last two appointments, to ICU for hypotension, hypoglycemia, and code on dialysis unit    Family updated: Yes:  children    Rhythm: Normal Sinus Rhythm     Most recent vitals:   Visit Vitals  BP (!) 84/58   Pulse 67   Temp 98.3 °F (36.8 °C) (Oral)   Resp 16   SpO2 96%           No data found.    No data found.      Respiratory support needed (if any):  - Ventilator Settings:  Vent Days 1    Vt (Set, mL): 350 mL  Resp Rate (Set): 18 bpm  FiO2 : 80 %    PEEP/CPAP (cmH2O): 8       Admission weight      Today's weight    Wt Readings from Last 1 Encounters:   10/01/24 58.5 kg (129 lb)        Marks need assessed each shift: N/A - no marks present  UOP >30ml/hr: NO - oliguria  Last documented BM (in last 48 hrs):  No data found.             Restraints (in use currently or dc'd in last 12 hrs): No    Order current and documentation up to date? No    Lines/Drains reviewed @ bedside.         Drip rates at handoff:    dextrose 5 % and 0.45 % NaCl 50 mL/hr at 10/03/24 1515    norepinephrine 5 mcg/min (10/03/24 1842)    prismaSATE BGK 4/2.5      prismaSATE BGK 4/2.5      prismaSATE BGK 4/2.5      propofol 20 mcg/kg/min (10/03/24 1851)       Lab Data:   CBC:   Recent Labs     10/03/24  1304   WBC 5.8   HGB 10.9*   HCT 33.4*   MCV 82.4        BMP:    Recent Labs     10/03/24  1304   *   K 5.2*   CO2 17*   BUN 73*   CREATININE 6.3*     LIVR:   Recent Labs     10/03/24  1304   AST 29   ALT 8*     PT/INR:   Recent Labs     10/03/24  1304   INR 1.53*     APTT: No results for input(s): \"APTT\" in the last 72 hours.  ABG: No results for input(s): \"PHART\", \"HLM3CKU\", \"PO2ART\" in the last 72 hours.    Any consults during the shift? Yes: Consults received: : CC Nephro, Piswalt    Any signed and held orders to be released?  No        4 Eyes Skin Assessment       The

## 2024-10-03 NOTE — PROGRESS NOTES
The patient started  agonal breathing at this time.  A rapid was called and the patient's blood was returned.  The patient was intubated by the code team.  The patient was disconnect from the HD machine and transported to the ICU via the code team.

## 2024-10-03 NOTE — ED NOTES
Patient resting comfortably, respirations easy, unlabored. Patient in no acute distress. Denies needs at this time. Call light within reach, bed in lowest position, side rails up x 2.  Family at bedside

## 2024-10-03 NOTE — CODE DOCUMENTATION
Code MD Boucher made aware of pt chest xray from ER showing large Pleural effusion and history of CHF> Md stated to stop fluids once levo started in ICU

## 2024-10-03 NOTE — PROGRESS NOTES
Patient transferred down from dialysis unit to room 2124 following code blue, patient intubated on floor, Vital signs stable at this time, transferred to ICU bed. Dr Boucher at bedside, see orders placed, plan for CRRT per Dr. Mcintosh.

## 2024-10-04 ENCOUNTER — APPOINTMENT (OUTPATIENT)
Dept: ULTRASOUND IMAGING | Age: 69
DRG: 853 | End: 2024-10-04
Payer: MEDICARE

## 2024-10-04 ENCOUNTER — APPOINTMENT (OUTPATIENT)
Dept: CT IMAGING | Age: 69
DRG: 853 | End: 2024-10-04
Attending: INTERNAL MEDICINE
Payer: MEDICARE

## 2024-10-04 LAB
ACANTHOCYTES BLD QL SMEAR: ABNORMAL
ACANTHOCYTES BLD QL SMEAR: ABNORMAL
ANION GAP SERPL CALCULATED.3IONS-SCNC: 19 MMOL/L (ref 3–16)
ANION GAP SERPL CALCULATED.3IONS-SCNC: 23 MMOL/L (ref 3–16)
ANION GAP SERPL CALCULATED.3IONS-SCNC: 24 MMOL/L (ref 3–16)
ANISOCYTOSIS BLD QL SMEAR: ABNORMAL
ANISOCYTOSIS BLD QL SMEAR: ABNORMAL
APTT BLD: 148.7 SEC (ref 22.1–36.4)
APTT BLD: 84.7 SEC (ref 22.1–36.4)
BACTERIA BLD CULT ORG #2: NORMAL
BACTERIA BLD CULT: NORMAL
BACTERIA URNS QL MICRO: ABNORMAL /HPF
BASE EXCESS BLDA CALC-SCNC: -15.6 MMOL/L (ref -3–3)
BASE EXCESS BLDA CALC-SCNC: -17 MMOL/L (ref -3–3)
BASOPHILS # BLD: 0 K/UL (ref 0–0.2)
BASOPHILS # BLD: 0 K/UL (ref 0–0.2)
BASOPHILS NFR BLD: 0 %
BASOPHILS NFR BLD: 0 %
BILIRUB UR QL STRIP.AUTO: NEGATIVE
BUN SERPL-MCNC: 21 MG/DL (ref 7–20)
BUN SERPL-MCNC: 24 MG/DL (ref 7–20)
BUN SERPL-MCNC: 38 MG/DL (ref 7–20)
BURR CELLS BLD QL SMEAR: ABNORMAL
CA-I BLD-SCNC: 0.88 MMOL/L (ref 1.12–1.32)
CA-I BLD-SCNC: 0.88 MMOL/L (ref 1.12–1.32)
CA-I BLD-SCNC: 0.95 MMOL/L (ref 1.12–1.32)
CALCIUM OXALATE CRYSTALS: PRESENT
CALCIUM SERPL-MCNC: 6.5 MG/DL (ref 8.3–10.6)
CALCIUM SERPL-MCNC: 6.6 MG/DL (ref 8.3–10.6)
CALCIUM SERPL-MCNC: 6.9 MG/DL (ref 8.3–10.6)
CHLORIDE SERPL-SCNC: 100 MMOL/L (ref 99–110)
CHLORIDE SERPL-SCNC: 89 MMOL/L (ref 99–110)
CHLORIDE SERPL-SCNC: 97 MMOL/L (ref 99–110)
CLARITY UR: ABNORMAL
CO2 BLDA-SCNC: 11 MMOL/L
CO2 BLDA-SCNC: 11 MMOL/L
CO2 SERPL-SCNC: 10 MMOL/L (ref 21–32)
CO2 SERPL-SCNC: 12 MMOL/L (ref 21–32)
CO2 SERPL-SCNC: 16 MMOL/L (ref 21–32)
COHGB MFR BLDA: 1.4 % (ref 0–1.5)
COLOR UR: ABNORMAL
CREAT SERPL-MCNC: 1.7 MG/DL (ref 0.6–1.2)
CREAT SERPL-MCNC: 2.2 MG/DL (ref 0.6–1.2)
CREAT SERPL-MCNC: 3.3 MG/DL (ref 0.6–1.2)
DACRYOCYTES BLD QL SMEAR: ABNORMAL
DEPRECATED RDW RBC AUTO: 22.8 % (ref 12.4–15.4)
DEPRECATED RDW RBC AUTO: 22.9 % (ref 12.4–15.4)
DEPRECATED RDW RBC AUTO: 23.5 % (ref 12.4–15.4)
DOHLE BOD BLD QL SMEAR: PRESENT
EOSINOPHIL # BLD: 0 K/UL (ref 0–0.6)
EOSINOPHIL # BLD: 0.1 K/UL (ref 0–0.6)
EOSINOPHIL NFR BLD: 0 %
EOSINOPHIL NFR BLD: 1 %
EPI CELLS #/AREA URNS AUTO: 24 /HPF (ref 0–5)
GFR SERPLBLD CREATININE-BSD FMLA CKD-EPI: 15 ML/MIN/{1.73_M2}
GFR SERPLBLD CREATININE-BSD FMLA CKD-EPI: 24 ML/MIN/{1.73_M2}
GFR SERPLBLD CREATININE-BSD FMLA CKD-EPI: 32 ML/MIN/{1.73_M2}
GLUCOSE BLD-MCNC: 105 MG/DL (ref 70–99)
GLUCOSE BLD-MCNC: 107 MG/DL (ref 70–99)
GLUCOSE BLD-MCNC: 113 MG/DL (ref 70–99)
GLUCOSE BLD-MCNC: 117 MG/DL (ref 70–99)
GLUCOSE BLD-MCNC: 122 MG/DL (ref 70–99)
GLUCOSE BLD-MCNC: 124 MG/DL (ref 70–99)
GLUCOSE BLD-MCNC: 133 MG/DL (ref 70–99)
GLUCOSE BLD-MCNC: 136 MG/DL (ref 70–99)
GLUCOSE BLD-MCNC: 143 MG/DL (ref 70–99)
GLUCOSE BLD-MCNC: 162 MG/DL (ref 70–99)
GLUCOSE BLD-MCNC: 260 MG/DL (ref 70–99)
GLUCOSE BLD-MCNC: 54 MG/DL (ref 70–99)
GLUCOSE BLD-MCNC: 63 MG/DL (ref 70–99)
GLUCOSE BLD-MCNC: 64 MG/DL (ref 70–99)
GLUCOSE BLD-MCNC: 67 MG/DL (ref 70–99)
GLUCOSE BLD-MCNC: 71 MG/DL (ref 70–99)
GLUCOSE BLD-MCNC: 74 MG/DL (ref 70–99)
GLUCOSE BLD-MCNC: 83 MG/DL (ref 70–99)
GLUCOSE BLD-MCNC: 89 MG/DL (ref 70–99)
GLUCOSE SERPL-MCNC: 116 MG/DL (ref 70–99)
GLUCOSE SERPL-MCNC: 132 MG/DL (ref 70–99)
GLUCOSE SERPL-MCNC: 51 MG/DL (ref 70–99)
GLUCOSE UR STRIP.AUTO-MCNC: NEGATIVE MG/DL
HCO3 BLDA-SCNC: 10 MMOL/L (ref 21–29)
HCO3 BLDA-SCNC: 10.3 MMOL/L (ref 21–29)
HCT VFR BLD AUTO: 27.5 % (ref 36–48)
HCT VFR BLD AUTO: 29.6 % (ref 36–48)
HCT VFR BLD AUTO: 30.5 % (ref 36–48)
HCT VFR BLD AUTO: 31 % (ref 36–48)
HGB BLD CALC-MCNC: 10.5 GM/DL (ref 12–16)
HGB BLD-MCNC: 8.8 G/DL (ref 12–16)
HGB BLD-MCNC: 9.5 G/DL (ref 12–16)
HGB BLD-MCNC: 9.9 G/DL (ref 12–16)
HGB BLDA-MCNC: 9.5 G/DL (ref 12–16)
HGB UR QL STRIP.AUTO: ABNORMAL
HYALINE CASTS #/AREA URNS AUTO: 34 /LPF (ref 0–8)
INR PPP: 3.33 (ref 0.85–1.15)
KETONES UR STRIP.AUTO-MCNC: ABNORMAL MG/DL
LACTATE BLD-SCNC: 8.15 MMOL/L (ref 0.4–2)
LACTATE BLDV-SCNC: 10.9 MMOL/L (ref 0.4–2)
LACTATE BLDV-SCNC: 8 MMOL/L (ref 0.4–1.9)
LEUKOCYTE ESTERASE UR QL STRIP.AUTO: ABNORMAL
LYMPHOCYTES # BLD: 0.7 K/UL (ref 1–5.1)
LYMPHOCYTES # BLD: 0.7 K/UL (ref 1–5.1)
LYMPHOCYTES NFR BLD: 12 %
LYMPHOCYTES NFR BLD: 8 %
MAGNESIUM SERPL-MCNC: 2.11 MG/DL (ref 1.8–2.4)
MAGNESIUM SERPL-MCNC: 2.32 MG/DL (ref 1.8–2.4)
MAGNESIUM SERPL-MCNC: 2.35 MG/DL (ref 1.8–2.4)
MCH RBC QN AUTO: 26.2 PG (ref 26–34)
MCH RBC QN AUTO: 26.5 PG (ref 26–34)
MCH RBC QN AUTO: 26.6 PG (ref 26–34)
MCHC RBC AUTO-ENTMCNC: 31.9 G/DL (ref 31–36)
MCHC RBC AUTO-ENTMCNC: 32 G/DL (ref 31–36)
MCHC RBC AUTO-ENTMCNC: 32.6 G/DL (ref 31–36)
MCV RBC AUTO: 81.7 FL (ref 80–100)
MCV RBC AUTO: 82.1 FL (ref 80–100)
MCV RBC AUTO: 82.7 FL (ref 80–100)
METAMYELOCYTES NFR BLD MANUAL: 1 %
METHGB MFR BLDA: 0.6 %
MONOCYTES # BLD: 0.2 K/UL (ref 0–1.3)
MONOCYTES # BLD: 0.2 K/UL (ref 0–1.3)
MONOCYTES NFR BLD: 3 %
MONOCYTES NFR BLD: 3 %
NEUTROPHILS # BLD: 5.1 K/UL (ref 1.7–7.7)
NEUTROPHILS # BLD: 5.7 K/UL (ref 1.7–7.7)
NEUTROPHILS NFR BLD: 31 %
NEUTROPHILS NFR BLD: 49 %
NEUTS BAND NFR BLD MANUAL: 36 % (ref 0–7)
NEUTS BAND NFR BLD MANUAL: 54 % (ref 0–7)
NEUTS VAC BLD QL SMEAR: PRESENT
NEUTS VAC BLD QL SMEAR: PRESENT
NITRITE UR QL STRIP.AUTO: NEGATIVE
NRBC BLD-RTO: 2 /100 WBC
NRBC BLD-RTO: 6 /100 WBC
O2 THERAPY: ABNORMAL
OVALOCYTES BLD QL SMEAR: ABNORMAL
OVALOCYTES BLD QL SMEAR: ABNORMAL
PCO2 BLDA: 22 MM HG (ref 35–45)
PCO2 BLDA: 24.1 MMHG (ref 35–45)
PERFORMED ON: ABNORMAL
PERFORMED ON: NORMAL
PH BLDA: 7.24 [PH] (ref 7.35–7.45)
PH BLDA: 7.26 [PH] (ref 7.35–7.45)
PH BLDV: 7.2 [PH] (ref 7.35–7.45)
PH BLDV: 7.36 [PH] (ref 7.35–7.45)
PH UR STRIP.AUTO: 5 [PH] (ref 5–8)
PHOSPHATE SERPL-MCNC: 4.8 MG/DL (ref 2.5–4.9)
PHOSPHATE SERPL-MCNC: 5.3 MG/DL (ref 2.5–4.9)
PHOSPHATE SERPL-MCNC: 5.4 MG/DL (ref 2.5–4.9)
PLATELET # BLD AUTO: 120 K/UL (ref 135–450)
PLATELET # BLD AUTO: 158 K/UL (ref 135–450)
PLATELET # BLD AUTO: 182 K/UL (ref 135–450)
PLATELET BLD QL SMEAR: ADEQUATE
PLATELET BLD QL SMEAR: ADEQUATE
PMV BLD AUTO: 7.4 FL (ref 5–10.5)
PMV BLD AUTO: 7.9 FL (ref 5–10.5)
PMV BLD AUTO: 8 FL (ref 5–10.5)
PO2 BLDA: 139.1 MM HG (ref 75–108)
PO2 BLDA: 213 MMHG (ref 75–108)
POC SAMPLE TYPE: ABNORMAL
POIKILOCYTOSIS BLD QL SMEAR: ABNORMAL
POIKILOCYTOSIS BLD QL SMEAR: ABNORMAL
POLYCHROMASIA BLD QL SMEAR: ABNORMAL
POLYCHROMASIA BLD QL SMEAR: ABNORMAL
POTASSIUM BLD-SCNC: 5 MMOL/L (ref 3.5–5.1)
POTASSIUM SERPL-SCNC: 5.1 MMOL/L (ref 3.5–5.1)
POTASSIUM SERPL-SCNC: 5.9 MMOL/L (ref 3.5–5.1)
POTASSIUM SERPL-SCNC: 6.2 MMOL/L (ref 3.5–5.1)
PROT UR STRIP.AUTO-MCNC: 300 MG/DL
PROTHROMBIN TIME: 33.6 SEC (ref 11.9–14.9)
RBC # BLD AUTO: 3.35 M/UL (ref 4–5.2)
RBC # BLD AUTO: 3.58 M/UL (ref 4–5.2)
RBC # BLD AUTO: 3.73 M/UL (ref 4–5.2)
RBC CLUMPS #/AREA URNS AUTO: 54 /HPF (ref 0–4)
REASON FOR REJECTION: NORMAL
REJECTED TEST: NORMAL
REPORT: NORMAL
SAO2 % BLDA: 99 % (ref 93–100)
SAO2 % BLDA: >100 %
SCHISTOCYTES BLD QL SMEAR: ABNORMAL
SCHISTOCYTES BLD QL SMEAR: ABNORMAL
SLIDE REVIEW: ABNORMAL
SODIUM BLD-SCNC: 130 MMOL/L (ref 136–145)
SODIUM SERPL-SCNC: 128 MMOL/L (ref 136–145)
SODIUM SERPL-SCNC: 131 MMOL/L (ref 136–145)
SODIUM SERPL-SCNC: 131 MMOL/L (ref 136–145)
SP GR UR STRIP.AUTO: 1.02 (ref 1–1.03)
TARGETS BLD QL SMEAR: ABNORMAL
TARGETS BLD QL SMEAR: ABNORMAL
TOXIC GRANULES BLD QL SMEAR: PRESENT
UA COMPLETE W REFLEX CULTURE PNL UR: ABNORMAL
UA DIPSTICK W REFLEX MICRO PNL UR: YES
URN SPEC COLLECT METH UR: ABNORMAL
UROBILINOGEN UR STRIP-ACNC: 1 E.U./DL
VARIANT LYMPHS NFR BLD MANUAL: 2 % (ref 0–6)
WBC # BLD AUTO: 6 K/UL (ref 4–11)
WBC # BLD AUTO: 6.6 K/UL (ref 4–11)
WBC # BLD AUTO: 9.9 K/UL (ref 4–11)
WBC #/AREA URNS AUTO: 9 /HPF (ref 0–5)

## 2024-10-04 PROCEDURE — 2580000003 HC RX 258: Performed by: INTERNAL MEDICINE

## 2024-10-04 PROCEDURE — 2500000003 HC RX 250 WO HCPCS: Performed by: INTERNAL MEDICINE

## 2024-10-04 PROCEDURE — 6360000002 HC RX W HCPCS: Performed by: INTERNAL MEDICINE

## 2024-10-04 PROCEDURE — 85610 PROTHROMBIN TIME: CPT

## 2024-10-04 PROCEDURE — 82947 ASSAY GLUCOSE BLOOD QUANT: CPT

## 2024-10-04 PROCEDURE — 84100 ASSAY OF PHOSPHORUS: CPT

## 2024-10-04 PROCEDURE — 94640 AIRWAY INHALATION TREATMENT: CPT

## 2024-10-04 PROCEDURE — 83735 ASSAY OF MAGNESIUM: CPT

## 2024-10-04 PROCEDURE — 70450 CT HEAD/BRAIN W/O DYE: CPT

## 2024-10-04 PROCEDURE — 87641 MR-STAPH DNA AMP PROBE: CPT

## 2024-10-04 PROCEDURE — 85025 COMPLETE CBC W/AUTO DIFF WBC: CPT

## 2024-10-04 PROCEDURE — 6360000002 HC RX W HCPCS

## 2024-10-04 PROCEDURE — 81001 URINALYSIS AUTO W/SCOPE: CPT

## 2024-10-04 PROCEDURE — 85014 HEMATOCRIT: CPT

## 2024-10-04 PROCEDURE — 83605 ASSAY OF LACTIC ACID: CPT

## 2024-10-04 PROCEDURE — 1200000000 HC SEMI PRIVATE

## 2024-10-04 PROCEDURE — 82803 BLOOD GASES ANY COMBINATION: CPT

## 2024-10-04 PROCEDURE — 94003 VENT MGMT INPAT SUBQ DAY: CPT

## 2024-10-04 PROCEDURE — 2000000000 HC ICU R&B

## 2024-10-04 PROCEDURE — 82330 ASSAY OF CALCIUM: CPT

## 2024-10-04 PROCEDURE — 2580000003 HC RX 258: Performed by: STUDENT IN AN ORGANIZED HEALTH CARE EDUCATION/TRAINING PROGRAM

## 2024-10-04 PROCEDURE — 6370000000 HC RX 637 (ALT 250 FOR IP): Performed by: INTERNAL MEDICINE

## 2024-10-04 PROCEDURE — 37799 UNLISTED PX VASCULAR SURGERY: CPT

## 2024-10-04 PROCEDURE — 99291 CRITICAL CARE FIRST HOUR: CPT | Performed by: INTERNAL MEDICINE

## 2024-10-04 PROCEDURE — 2580000003 HC RX 258

## 2024-10-04 PROCEDURE — 2700000000 HC OXYGEN THERAPY PER DAY

## 2024-10-04 PROCEDURE — 84132 ASSAY OF SERUM POTASSIUM: CPT

## 2024-10-04 PROCEDURE — 80048 BASIC METABOLIC PNL TOTAL CA: CPT

## 2024-10-04 PROCEDURE — 2500000003 HC RX 250 WO HCPCS

## 2024-10-04 PROCEDURE — 85730 THROMBOPLASTIN TIME PARTIAL: CPT

## 2024-10-04 PROCEDURE — 84295 ASSAY OF SERUM SODIUM: CPT

## 2024-10-04 PROCEDURE — 85027 COMPLETE CBC AUTOMATED: CPT

## 2024-10-04 RX ORDER — WATER 10 ML/10ML
INJECTION INTRAMUSCULAR; INTRAVENOUS; SUBCUTANEOUS
Status: COMPLETED
Start: 2024-10-04 | End: 2024-10-04

## 2024-10-04 RX ORDER — DEXTROSE MONOHYDRATE 100 MG/ML
INJECTION, SOLUTION INTRAVENOUS CONTINUOUS
Status: DISCONTINUED | OUTPATIENT
Start: 2024-10-04 | End: 2024-10-05 | Stop reason: HOSPADM

## 2024-10-04 RX ORDER — DEXTROSE MONOHYDRATE 25 G/50ML
25 INJECTION, SOLUTION INTRAVENOUS ONCE
Status: COMPLETED | OUTPATIENT
Start: 2024-10-04 | End: 2024-10-04

## 2024-10-04 RX ORDER — CALCIUM CHLORIDE, MAGNESIUM CHLORIDE, DEXTROSE MONOHYDRATE, LACTIC ACID, SODIUM CHLORIDE, SODIUM BICARBONATE AND POTASSIUM CHLORIDE 5.15; 2.03; 22; 5.4; 6.46; 3.09; .157 G/L; G/L; G/L; G/L; G/L; G/L; G/L
INJECTION INTRAVENOUS CONTINUOUS
Status: DISCONTINUED | OUTPATIENT
Start: 2024-10-04 | End: 2024-10-05 | Stop reason: HOSPADM

## 2024-10-04 RX ORDER — HEPARIN SODIUM 1000 [USP'U]/ML
INJECTION, SOLUTION INTRAVENOUS; SUBCUTANEOUS PRN
Status: DISCONTINUED | OUTPATIENT
Start: 2024-10-04 | End: 2024-10-04

## 2024-10-04 RX ORDER — DEXTROSE MONOHYDRATE 25 G/50ML
INJECTION, SOLUTION INTRAVENOUS
Status: COMPLETED
Start: 2024-10-04 | End: 2024-10-04

## 2024-10-04 RX ORDER — LINEZOLID 2 MG/ML
600 INJECTION, SOLUTION INTRAVENOUS EVERY 12 HOURS
Status: DISCONTINUED | OUTPATIENT
Start: 2024-10-04 | End: 2024-10-05 | Stop reason: HOSPADM

## 2024-10-04 RX ORDER — METHYLPREDNISOLONE SODIUM SUCCINATE 125 MG/2ML
125 INJECTION, POWDER, LYOPHILIZED, FOR SOLUTION INTRAMUSCULAR; INTRAVENOUS EVERY 12 HOURS
Status: COMPLETED | OUTPATIENT
Start: 2024-10-04 | End: 2024-10-04

## 2024-10-04 RX ORDER — HEPARIN SODIUM 1000 [USP'U]/ML
INJECTION, SOLUTION INTRAVENOUS; SUBCUTANEOUS PRN
Status: DISCONTINUED | OUTPATIENT
Start: 2024-10-04 | End: 2024-10-05 | Stop reason: HOSPADM

## 2024-10-04 RX ADMIN — DEXTROSE MONOHYDRATE: 100 INJECTION, SOLUTION INTRAVENOUS at 13:26

## 2024-10-04 RX ADMIN — Medication: at 22:17

## 2024-10-04 RX ADMIN — CALCIUM CHLORIDE, MAGNESIUM CHLORIDE, DEXTROSE MONOHYDRATE, LACTIC ACID, SODIUM CHLORIDE, SODIUM BICARBONATE AND POTASSIUM CHLORIDE: 5.15; 2.03; 22; 5.4; 6.46; 3.09; .157 INJECTION INTRAVENOUS at 14:07

## 2024-10-04 RX ADMIN — METHYLPREDNISOLONE SODIUM SUCCINATE 125 MG: 125 INJECTION INTRAMUSCULAR; INTRAVENOUS at 13:56

## 2024-10-04 RX ADMIN — Medication: at 04:14

## 2024-10-04 RX ADMIN — Medication: at 16:05

## 2024-10-04 RX ADMIN — SODIUM BICARBONATE 50 MEQ: 84 INJECTION INTRAVENOUS at 06:17

## 2024-10-04 RX ADMIN — CALCIUM GLUCONATE 2000 MG: 20 INJECTION, SOLUTION INTRAVENOUS at 18:10

## 2024-10-04 RX ADMIN — WATER 2 ML: 1 INJECTION INTRAMUSCULAR; INTRAVENOUS; SUBCUTANEOUS at 00:38

## 2024-10-04 RX ADMIN — CALCIUM GLUCONATE 1000 MG: 20 INJECTION, SOLUTION INTRAVENOUS at 14:03

## 2024-10-04 RX ADMIN — Medication: at 04:19

## 2024-10-04 RX ADMIN — ATORVASTATIN CALCIUM 40 MG: 40 TABLET, FILM COATED ORAL at 09:10

## 2024-10-04 RX ADMIN — Medication 50 MEQ: at 02:18

## 2024-10-04 RX ADMIN — DEXTROSE MONOHYDRATE 125 ML: 100 INJECTION, SOLUTION INTRAVENOUS at 04:24

## 2024-10-04 RX ADMIN — Medication 50 MEQ: at 06:17

## 2024-10-04 RX ADMIN — Medication: at 18:05

## 2024-10-04 RX ADMIN — Medication 2 PUFF: at 20:12

## 2024-10-04 RX ADMIN — CEFEPIME 2000 MG: 2 INJECTION, POWDER, FOR SOLUTION INTRAVENOUS at 13:55

## 2024-10-04 RX ADMIN — HEPARIN SODIUM 5000 UNITS: 5000 INJECTION INTRAVENOUS; SUBCUTANEOUS at 05:43

## 2024-10-04 RX ADMIN — SODIUM BICARBONATE 50 MEQ: 84 INJECTION INTRAVENOUS at 02:18

## 2024-10-04 RX ADMIN — SODIUM CHLORIDE, PRESERVATIVE FREE 10 ML: 5 INJECTION INTRAVENOUS at 09:10

## 2024-10-04 RX ADMIN — DEXTROSE MONOHYDRATE: 100 INJECTION, SOLUTION INTRAVENOUS at 08:18

## 2024-10-04 RX ADMIN — Medication 10 MCG/MIN: at 10:58

## 2024-10-04 RX ADMIN — PHENYLEPHRINE HYDROCHLORIDE 30 MCG/MIN: 10 INJECTION INTRAVENOUS at 07:29

## 2024-10-04 RX ADMIN — HEPARIN SODIUM: 1000 INJECTION INTRAVENOUS; SUBCUTANEOUS at 02:48

## 2024-10-04 RX ADMIN — CALCIUM GLUCONATE 2000 MG: 20 INJECTION, SOLUTION INTRAVENOUS at 02:26

## 2024-10-04 RX ADMIN — CEFEPIME 2000 MG: 2 INJECTION, POWDER, FOR SOLUTION INTRAVENOUS at 05:57

## 2024-10-04 RX ADMIN — Medication: at 20:14

## 2024-10-04 RX ADMIN — GABAPENTIN 300 MG: 300 CAPSULE ORAL at 20:37

## 2024-10-04 RX ADMIN — CEFEPIME 2000 MG: 2 INJECTION, POWDER, FOR SOLUTION INTRAVENOUS at 22:11

## 2024-10-04 RX ADMIN — Medication: at 05:48

## 2024-10-04 RX ADMIN — Medication 2 PUFF: at 07:35

## 2024-10-04 RX ADMIN — DEXTROSE MONOHYDRATE 125 ML: 100 INJECTION, SOLUTION INTRAVENOUS at 06:41

## 2024-10-04 RX ADMIN — SODIUM CHLORIDE, PRESERVATIVE FREE 10 ML: 5 INJECTION INTRAVENOUS at 20:37

## 2024-10-04 RX ADMIN — METHYLPREDNISOLONE SODIUM SUCCINATE 125 MG: 125 INJECTION INTRAMUSCULAR; INTRAVENOUS at 00:38

## 2024-10-04 RX ADMIN — DEXTROSE MONOHYDRATE 25 G: 25 INJECTION, SOLUTION INTRAVENOUS at 00:29

## 2024-10-04 RX ADMIN — VASOPRESSIN 0.03 UNITS/MIN: 20 INJECTION INTRAVENOUS at 15:25

## 2024-10-04 RX ADMIN — Medication: at 14:07

## 2024-10-04 RX ADMIN — GABAPENTIN 300 MG: 300 CAPSULE ORAL at 00:09

## 2024-10-04 RX ADMIN — DEXTROSE MONOHYDRATE 125 ML: 100 INJECTION, SOLUTION INTRAVENOUS at 17:47

## 2024-10-04 RX ADMIN — HEPARIN SODIUM 5000 UNITS: 5000 INJECTION INTRAVENOUS; SUBCUTANEOUS at 00:09

## 2024-10-04 RX ADMIN — SODIUM CHLORIDE, PRESERVATIVE FREE 10 ML: 5 INJECTION INTRAVENOUS at 00:11

## 2024-10-04 RX ADMIN — Medication: at 03:06

## 2024-10-04 RX ADMIN — PANTOPRAZOLE SODIUM 40 MG: 40 TABLET, DELAYED RELEASE ORAL at 05:43

## 2024-10-04 RX ADMIN — LEVOTHYROXINE SODIUM 200 MCG: 0.1 TABLET ORAL at 05:44

## 2024-10-04 RX ADMIN — Medication: at 03:04

## 2024-10-04 RX ADMIN — SODIUM CHLORIDE: 9 INJECTION, SOLUTION INTRAVENOUS at 02:24

## 2024-10-04 RX ADMIN — DEXTROSE MONOHYDRATE 125 ML: 100 INJECTION, SOLUTION INTRAVENOUS at 20:35

## 2024-10-04 RX ADMIN — PHENYLEPHRINE HYDROCHLORIDE 30 MCG/MIN: 10 INJECTION INTRAVENOUS at 21:37

## 2024-10-04 RX ADMIN — LEVOTHYROXINE SODIUM 25 MCG: 0.03 TABLET ORAL at 05:43

## 2024-10-04 RX ADMIN — Medication: at 09:10

## 2024-10-04 RX ADMIN — CALCIUM CHLORIDE, MAGNESIUM CHLORIDE, DEXTROSE MONOHYDRATE, LACTIC ACID, SODIUM CHLORIDE, SODIUM BICARBONATE AND POTASSIUM CHLORIDE: 5.15; 2.03; 22; 5.4; 6.46; 3.09; .157 INJECTION INTRAVENOUS at 19:11

## 2024-10-04 RX ADMIN — Medication: at 09:43

## 2024-10-04 RX ADMIN — LINEZOLID 600 MG: 600 INJECTION, SOLUTION INTRAVENOUS at 11:02

## 2024-10-04 ASSESSMENT — PULMONARY FUNCTION TESTS
PIF_VALUE: 25
PIF_VALUE: 17
PIF_VALUE: 23
PIF_VALUE: 23
PIF_VALUE: 25
PIF_VALUE: 25
PIF_VALUE: 24
PIF_VALUE: 26
PIF_VALUE: 26
PIF_VALUE: 27
PIF_VALUE: 25
PIF_VALUE: 25
PIF_VALUE: 24
PIF_VALUE: 26
PIF_VALUE: 22
PIF_VALUE: 24
PIF_VALUE: 24
PIF_VALUE: 26
PIF_VALUE: 25
PIF_VALUE: 10
PIF_VALUE: 23
PIF_VALUE: 23
PIF_VALUE: 24
PIF_VALUE: 20
PIF_VALUE: 24
PIF_VALUE: 15
PIF_VALUE: 23
PIF_VALUE: 23
PIF_VALUE: 24
PIF_VALUE: 26
PIF_VALUE: 22
PIF_VALUE: 23
PIF_VALUE: 24
PIF_VALUE: 24
PIF_VALUE: 18
PIF_VALUE: 23
PIF_VALUE: 24
PIF_VALUE: 24
PIF_VALUE: 21
PIF_VALUE: 24
PIF_VALUE: 22
PIF_VALUE: 24
PIF_VALUE: 26
PIF_VALUE: 21
PIF_VALUE: 18
PIF_VALUE: 24
PIF_VALUE: 26
PIF_VALUE: 26
PIF_VALUE: 24
PIF_VALUE: 24
PIF_VALUE: 25
PIF_VALUE: 27
PIF_VALUE: 24
PIF_VALUE: 26
PIF_VALUE: 29
PIF_VALUE: 28
PIF_VALUE: 24
PIF_VALUE: 27
PIF_VALUE: 18
PIF_VALUE: 25
PIF_VALUE: 22
PIF_VALUE: 24
PIF_VALUE: 21
PIF_VALUE: 23
PIF_VALUE: 25
PIF_VALUE: 28
PIF_VALUE: 26
PIF_VALUE: 17
PIF_VALUE: 23
PIF_VALUE: 21
PIF_VALUE: 23
PIF_VALUE: 24
PIF_VALUE: 25
PIF_VALUE: 25
PIF_VALUE: 26
PIF_VALUE: 22
PIF_VALUE: 24
PIF_VALUE: 23
PIF_VALUE: 24
PIF_VALUE: 23
PIF_VALUE: 24
PIF_VALUE: 25
PIF_VALUE: 19
PIF_VALUE: 25
PIF_VALUE: 15
PIF_VALUE: 25
PIF_VALUE: 22
PIF_VALUE: 19
PIF_VALUE: 26
PIF_VALUE: 24
PIF_VALUE: 22
PIF_VALUE: 24
PIF_VALUE: 25
PIF_VALUE: 24
PIF_VALUE: 27

## 2024-10-04 ASSESSMENT — PAIN SCALES - GENERAL
PAINLEVEL_OUTOF10: 0
PAINLEVEL_OUTOF10: 0

## 2024-10-04 NOTE — PROGRESS NOTES
[facility] notified of GCS: 3. Awaiting callback.     Electronically signed by Kailee Leyva RN on 10/4/2024 at 3:28 AM

## 2024-10-04 NOTE — CONSULTS
[facility]  Palliative Care   Consult Note    NAME:  Shanna Mejia  MEDICAL RECORD NUMBER:  [id number]  AGE: 68 y.o.   GENDER: female  : 1955  TODAY'S DATE:  10/4/2024    Subjective     Reason for Consult:  goals of care and code status   Visit Type: Initial Consult      Shanna Mejia is a 68 y.o. female referred by:   [x] Physician    PAST MEDICAL HISTORY      Diagnosis Date    Carotid artery stenosis     CHF (congestive heart failure) (HCC)     CKD (chronic kidney disease)     Coronary artery disease     Diabetes mellitus (HCC)     on no meds -[id number]    Dilated cardiomyopathy (HCC)     Gout     Hemodialysis patient (HCC)     Hyperlipidemia     Hypertension     Hypothyroidism     Ischemic stroke (HCC)     2016    Lung nodule     Neuropathy     Osteopenia     Peripheral arterial disease (HCC)     Peripheral neuropathy     Tobacco use disorder        PAST SURGICAL HISTORY  Past Surgical History:   Procedure Laterality Date    CARDIAC CATHETERIZATION      CARDIAC PROCEDURE N/A 2024    Peripheral angiography performed by Maximo Boo MD at [facility] CARDIAC CATH LAB    CARDIAC PROCEDURE N/A 2024    Peripheral angiography performed by Armaan Mandujano MD at [facility] CARDIAC CATH LAB    CAROTID ENDARTERECTOMY Left     2017    CATARACT REMOVAL Right 2022    CORONARY ANGIOPLASTY WITH STENT PLACEMENT  03/2019    X 1    CYST INCISION AND DRAINAGE      right arm    DIALYSIS FISTULA CREATION Left 2024    LEFT ARM ARTERIOVENOUS FISTULA CREATION performed by Dennis Sotomayor II, MD at [facility] OR    HYSTERECTOMY (CERVIX STATUS UNKNOWN)      INTRACAPSULAR CATARACT EXTRACTION Left 2020    PHACOEMULSIFICATION WITH INTRAOCULAR LENS IMPLANT performed by Akira Reza MD at [facility] MOB SURG CTR    INTRACAPSULAR CATARACT EXTRACTION Right 2022    PHACOEMULSIFICATION WITH INTRAOCULAR LENS IMPLANT - RIGHT EYE performed by Akira Reza MD at [facility] MOB SURG CTR    INVASIVE  follow   [] Other:    Discussion: Patient admitted from home for AMS and has missed 2 dialysis appts. PMH of ESRD on HD M-W-F, CHF, CAD, DM, HLD, HTN, PAD. While in dialysis she suffered arrest and is now in ICU on vent, pressors, CRRT and not responding she is on no sedation. I have met with her 3 daughters to discuss goals of care. We discussed what has happened since admission they expressed understanding. The family asked if I could call [facility] to have her son call family, he was able to call his family to receive information about his mother.   I did speak with family again and they have decided to change code status to DNR CC continue with care for next 24 to 48 hours and see what physician have to say about her progress over the next few days.   Dr Boucher informed of above,        I will continue to follow Ms. Mejia's care as needed.      Thank you for allowing me to participate in the care of Ms. Mejia .     Electronically signed by Marlyn Berumen RN, BSN, CHPN on 10/4/2024 at 11:41 AM  Palliative Care Nurse [facility]  Office: [phone]

## 2024-10-04 NOTE — CONSULTS
Pulmonary Consult Note     Patient's name:  Shanna Mejia  Medical Record Number: [id number]  Patient's account/billing number: [id number]  Patient's YOB: 1955  Age: 68 y.o.  Date of Admission: 10/3/2024 12:51 PM  Date of Consult: 10/4/2024      Primary Care Physician: Sona Thomas MD      Code Status: Full Code    Reason for consult: cardiac arrest     Assessment and Plan     Acute respiratory failure on mechanical ventilation  Large right pleural effusion with PNA  Likely anoxic brain injury  Septic shock  End-stage renal disease on hemodialysis, metabolic acidosis  Chronic systolic heart failure  Hypoglycemia      Plan:  Ventilator support settings reviewed and adjusted.  We will obtain CT scan of the head and will consider an EEG as well.  Suspect anoxic brain injury at this point we will hold off thoracentesis  Cefepime and Zyvox  CRRT per nephrology.  Pressors titrate to keep MAP more than 60.  Stress dose steroid  GI prophylaxis.  Due to the immediate potential for life-threatening deterioration due to above , I spent 35 minutes providing critical care.  This time is excluding time spent performing procedures.  This note was transcribed using Dragon Dictation software. Please disregard any translational errors.      HISTORY OF PRESENT ILLNESS:   Mr./Ms. Shanna Mejia is a 68 y.o. lady currently intubated on mechanical ventilation.  She has a medical history stated below significant for end-stage renal disease on hemodialysis, chronic hypotension, had a cardiac arrest while in dialysis yesterday  Patient started to have agonal breathing, rapid response called, patient was hypotensive blood pressure 57/23 bradycardic, blood sugar 108, hypoxic intubated and placed on mechanical ventilation,  Currently on multiple pressors.  On CRRT.        Past Medical History:        Diagnosis Date    Carotid artery stenosis     CHF (congestive heart failure)  complete  atelectasis/consolidation of the right lower lobe and the right middle lobe.    Stable cardiomegaly.      CXR portable: Results for orders placed during the hospital encounter of 10/03/24    XR CHEST PORTABLE    Narrative  EXAMINATION:  ONE XRAY VIEW OF THE CHEST    10/3/2024 6:10 pm    COMPARISON:  10/03/2024    HISTORY:  ORDERING SYSTEM PROVIDED HISTORY: ETT placement  TECHNOLOGIST PROVIDED HISTORY:  Reason for exam:->ETT placement  Reason for Exam: ETT    FINDINGS:  The endotracheal tube lies at 3.8 cm above the bryant.  Tunneled dialysis  catheter terminates in the right atrium.  Loop recorder device.  Enteric  catheter side hole seen in the region of the GE junction or just above, with  the tip in the proximal stomach.  There is a moderate right-sided pleural  effusion.  Mild central vascular congestion.  No overt edema.  Enlargement of  the cardiac silhouette.  Diffuse osteopenia.  Degenerative changes are seen  in the spine and right shoulder.    Impression  1. Endotracheal tube in good position.  2. Enteric catheter side hole in the region of the GE junction or just above,  with the tip in the proximal stomach.  3. Moderate right-sided pleural effusion.  4. Cardiomegaly with mild central vascular congestion.                     Kelsi Stanton MD, M.D.            10/4/2024, 12:40 PM

## 2024-10-04 NOTE — PLAN OF CARE
Problem: Discharge Planning  Goal: Discharge to home or other facility with appropriate resources  10/4/2024 1719 by Kerley, Jessica L, RN  Outcome: Progressing  Flowsheets (Taken 10/4/2024 0800)  Discharge to home or other facility with appropriate resources: Identify barriers to discharge with patient and caregiver  10/4/2024 0451 by Kailee Leyva RN  Outcome: Not Progressing     Problem: Neurosensory - Adult  Goal: Achieves stable or improved neurological status  10/4/2024 1719 by Kerley, Jessica L, RN  Outcome: Progressing  Flowsheets (Taken 10/4/2024 0800)  Achieves stable or improved neurological status: Assess for and report changes in neurological status  10/4/2024 0451 by Kailee Leyva RN  Outcome: Not Progressing  Goal: Achieves maximal functionality and self care  10/4/2024 1719 by Kerley, Jessica L, RN  Outcome: Progressing  Flowsheets (Taken 10/4/2024 0800)  Achieves maximal functionality and self care: Monitor swallowing and airway patency with patient fatigue and changes in neurological status  10/4/2024 0451 by Kailee Leyva RN  Outcome: Not Progressing     Problem: Cardiovascular - Adult  Goal: Maintains optimal cardiac output and hemodynamic stability  10/4/2024 1719 by Kerley, Jessica L, RN  Outcome: Progressing  Flowsheets (Taken 10/4/2024 0800)  Maintains optimal cardiac output and hemodynamic stability: Monitor blood pressure and heart rate  10/4/2024 0451 by Kailee Leyva RN  Outcome: Not Progressing  Goal: Absence of cardiac dysrhythmias or at baseline  10/4/2024 1719 by Kerley, Jessica L, RN  Outcome: Progressing  Flowsheets (Taken 10/4/2024 0800)  Absence of cardiac dysrhythmias or at baseline: Monitor cardiac rate and rhythm  10/4/2024 0451 by Kailee Leyva RN  Outcome: Not Progressing     Problem: Musculoskeletal - Adult  Goal: Return mobility to safest level of function  10/4/2024 1719 by Kerley, Jessica L, RN  Outcome: Progressing  Flowsheets (Taken 10/4/2024 0800)  Return  Mobility to Safest Level of Function: Assess patient stability and activity tolerance for standing, transferring and ambulating with or without assistive devices  10/4/2024 0451 by Kailee Leyva RN  Outcome: Not Progressing  Goal: Return ADL status to a safe level of function  10/4/2024 1719 by Kerley, Jessica L, RN  Outcome: Progressing  Flowsheets (Taken 10/4/2024 0800)  Return ADL Status to a Safe Level of Function: Administer medication as ordered  10/4/2024 0451 by Kailee Leyva RN  Outcome: Not Progressing     Problem: Gastrointestinal - Adult  Goal: Maintains adequate nutritional intake  10/4/2024 1719 by Kerley, Jessica L, RN  Outcome: Progressing  Flowsheets (Taken 10/4/2024 0800)  Maintains adequate nutritional intake: Monitor percentage of each meal consumed  10/4/2024 0451 by Kailee Leyva RN  Outcome: Not Progressing     Problem: Metabolic/Fluid and Electrolytes - Adult  Goal: Electrolytes maintained within normal limits  10/4/2024 1719 by Kerley, Jessica L, RN  Outcome: Progressing  Flowsheets (Taken 10/4/2024 0800)  Electrolytes maintained within normal limits: Monitor labs and assess patient for signs and symptoms of electrolyte imbalances  10/4/2024 0451 by Kailee Leyva RN  Outcome: Not Progressing  Goal: Glucose maintained within prescribed range  10/4/2024 1719 by Kerley, Jessica L, RN  Outcome: Progressing  Flowsheets (Taken 10/4/2024 0800)  Glucose maintained within prescribed range: Monitor blood glucose as ordered  10/4/2024 0451 by Kailee Leyva RN  Outcome: Not Progressing     Problem: Hematologic - Adult  Goal: Maintains hematologic stability  10/4/2024 1719 by Kerley, Jessica L, RN  Outcome: Progressing  Flowsheets (Taken 10/4/2024 0800)  Maintains hematologic stability: Assess for signs and symptoms of bleeding or hemorrhage  10/4/2024 0451 by Kailee Leyva RN  Outcome: Not Progressing

## 2024-10-04 NOTE — PROGRESS NOTES
Pt blood cultures (+) for enterobacterales. Secure message to Dorinda BARRON. IV antibiotic orders placed. (See MAR)    Electronically signed by Kailee Leyva RN on 10/4/2024 at 5:26 AM

## 2024-10-04 NOTE — PROCEDURES
PROCEDURE NOTE  Date: 10/4/2024   Name: Shanna Mejia  YOB: 1955    Procedures  Arterial Catheter Insertion Procedure Note    Consent: The daughter was counseled regarding the procedure, its indications, risks, potential complications and alternatives, and any questions were answered. Consent was obtained to proceed.    Procedure: Insertion of Arterial Catheter    Indications:  Hypotension, Shock    Estimated Blood Loss: Minimal    Procedure Details   Informed consent was obtained for the procedure, including sedation.  Risks of hemorrhage, arrhythmia, infection and adverse drug reaction were discussed.   A time out was performed at 22:45*  Ultrasound used and Right Radial artery was noted to be patent.  Collatoral flow was confirmed with an Christiano's Test.  Under sterile conditions the skin above the Right Radial artery was prepped with Chloraprep and covered with a sterile drape after donning mask, sterile gown, annd sterile gloves.  A 22-gauge needle was inserted into the Right Radial artery.  A guide wire was then passed easily through the needle which was advanced with no resistance. Good flash of blood returned. The catheter was advanced over the existing wire without resistance or complication and subsequently secured into place after pressure tubing connected and waveform verified on monitor.    Findings:  There were no complications nor changes to vital signs. Patient tolerated procedure well.    Total time of procedure: 15 minutes

## 2024-10-04 NOTE — PROGRESS NOTES
Patient ID: Shanna Mejia  Referring/ Physician: Serenity Boucher MD      Summary:   Shanna Mejia is being seen by nephrology for ESRD.     Reason for admission: altered mental status      Interval Hx:    Seen at bedside.   Just got back from CT.  She is intubated.   On 50 % fio2   BP MAP 81 on levo 10 and vaso   PH 7.2.  No significant reported CRRT down time. She was off this AM only for 20 minutes.     K 6.2   Bicarb 10   Lactic 10.9  Ca 6.6   Ptt 148.7     Assessment/Plan:   Critically ill, seems like septic shock.     - worsening acidosis and lactic acid rising.   One blood cx + for enterobacter.  - increase effluent dose to 2.5 L/hr and change post filter solution to isotonic bicarb at 500 cc/hr   - on fixed dose heparin with CRRT , PTT was elevated, holding for one hour and restart. Holding SQ heparin.   - check CBC          ESRD  HD Monday Wednesday Friday via tunneled dialysis catheter.  She missed 2 sessions of dialysis this past week.  She is presenting with fluid overload, worsening pleural effusion and shortness of breath.      Hypoxic respiratory failure  Enlarging right-sided pleural effusion  CHF on chest x-ray  Now intubated.  Will try to optimize volume status with ultrafiltration.  Plan for possible thoracentesis.    Metabolic acidosis  Managing with dialysis.  Post filter bicarb      Hyperkalemia   Rising despite 4 K bath CRRT so changing to 2 K   Address with dialysis.     PVD  Has some ulcers on LE noted    Septic shock   + enterobacter blood cx  Has peripheral foot wound. Also has tunneled dialysis line as possible sources.   She is on pressors and ABX    [facility] would like to thank you for the opportunity to serve this patient. Please call with any questions or concerns.    Pat Mcintosh MD  [facility]  [address] [place] [address]  Fax: [phone]  Office: [phone]    HPI:  Shanna Mejia is a 68 y.o. female with  has a past  Refractive surgery (Left, 02/18/2019); Coronary angioplasty with stent (03/2019); Cardiac catheterization; Intracapsular cataract extraction (Left, 07/30/2020); Intracapsular cataract extraction (Right, 05/13/2022); Cataract removal (Right, 05/2022); IR TUNNELED CVC PLACE WO SQ PORT/PUMP > 5 YEARS (Right, 03/13/2024); IR TUNNELED CVC PLACE WO SQ PORT/PUMP > 5 YEARS (3/13/2024); Dialysis fistula creation (Left, 7/11/2024); Cardiac procedure (N/A, 7/26/2024); invasive vascular (N/A, 7/30/2024); Cardiac procedure (N/A, 9/4/2024); invasive vascular (N/A, 9/4/2024); and invasive vascular (N/A, 9/4/2024).     Social Hx: reviewed and updated as appropriate  Social History     Tobacco Use    Smoking status: Every Day     Current packs/day: 0.50     Average packs/day: 0.5 packs/day for 47.0 years (23.5 ttl pk-yrs)     Types: Cigarettes    Smokeless tobacco: Never   Substance Use Topics    Alcohol use: No        Family hx: reviewed and updated as appropriate  family history includes Diabetes in her father; Heart Disease in her father; Kidney Disease in her mother; Stroke in her father.    Medications:   methylPREDNISolone, 125 mg, Q12H  cefepime, 2,000 mg, Q8H  aspirin, 81 mg, Daily  atorvastatin, 40 mg, Daily  budesonide-formoterol, 2 puff, BID RT  [Held by provider] calcitRIOL, 2.25 mcg, Once per day on Monday Wednesday Friday  carvedilol, 6.25 mg, BID WC  clopidogrel, 75 mg, Daily  gabapentin, 300 mg, QHS  isosorbide mononitrate, 30 mg, Daily  levothyroxine, 200 mcg, Daily  levothyroxine, 25 mcg, Daily  insulin lispro, 0-4 Units, TID WC  insulin lispro, 0-4 Units, Nightly  heparin (porcine), 5,000 Units, 3 times per day  pantoprazole, 40 mg, QAM AC  sodium chloride flush, 5-40 mL, 2 times per day  lidocaine 1 % injection, 50 mg, Once       Patient has no known allergies.    Allergies:   No Known Allergies      Physical Exam/Objective:   Vitals:    10/04/24 0830   BP:    Pulse: 94   Resp: 22   Temp:    SpO2: (!) 62%

## 2024-10-04 NOTE — PROGRESS NOTES
sodium chloride 0.9 % 250 mL infusion Stopped (10/04/24 0808)    heparin (porcine) 5,000 Units in sodium chloride 0.9 % 20 mL infusion 1.2 mL/hr at 10/04/24 1051    prismaSol BGK 2/3.5 1,000 mL/hr at 10/04/24 1407    prismaSol BGK 2/3.5 1,000 mL/hr at 10/04/24 1407    sterile water 1,000 mL with sodium bicarbonate 150 mEq solution 500 mL/hr at 10/04/24 1805    dextrose 50 mL/hr at 10/04/24 1839    norepinephrine 15 mcg/min (10/04/24 1839)    propofol Stopped (10/04/24 0024)    sodium chloride 5 mL/hr at 10/04/24 1839    dextrose Stopped (10/04/24 1246)    VASOpressin 20 Units in sodium chloride 0.9 % 100 mL infusion 0.03 Units/min (10/04/24 1839)    heparin (porcine) 5,000 Units in sodium chloride 0.9 % 1,000 mL infusion 25 mL/hr (10/03/24 2302)       Lab Data:   CBC:   Recent Labs     10/04/24  0522 10/04/24  1246   WBC 6.6 9.9   HGB 9.5* 8.8*   HCT 29.6* 27.5*   MCV 82.7 82.1    120*     BMP:    Recent Labs     10/04/24  0200 10/04/24  1026   * 131*   K 5.1 6.2*   CO2 12* 10*   BUN 38* 24*   CREATININE 3.3* 2.2*     LIVR:   Recent Labs     10/03/24  1304   AST 29   ALT 8*     PT/INR:   Recent Labs     10/03/24  1304 10/04/24  0815   INR 1.53* 3.33*     APTT:   Recent Labs     10/04/24  0910 10/04/24  1600   APTT 148.7* 84.7*     ABG:   Recent Labs     10/04/24  0158 10/04/24  0522   PHART 7.264* 7.237*   [id number] 22.0* 24.1*   PO2ART 139.1* 213.0*       Any consults during the shift? No    Any signed and held orders to be released?  No        4 Eyes Skin Assessment       The patient is being assessed for  Shift Handoff    I agree that at least one RN has performed a thorough Head to Toe Skin Assessment on the patient. ALL assessment sites listed below have been assessed.      Areas assessed by both nurses: Head, Face, Ears, Shoulders, Back, Chest, Arms, Elbows, Hands, Sacrum. Buttock, Coccyx, Ischium, Legs. Feet and Heels, and Under Medical Devices         Does the Patient have a Wound? Yes wound(s)

## 2024-10-04 NOTE — CARE COORDINATION
Wound care consulted for BLE wounds. Vascular consulted. Pt currently in critical condition in ICU on vent. Palliative care also consulted. Recommended wet to dry dressings with vashe to BLE due to odor reported by bedside RN. Obvious necrosis in image obtained by bedside RN. Will defer consult to vascular.   Will not continue to follow, please re-consult if needed. Continue with prevention measures.  Electronically signed by Mayelin Wilkinson RN CWOCN on 10/4/2024 at 3:54 PM

## 2024-10-04 NOTE — PROGRESS NOTES
Pt filter # and PD increasing. 217mL blood returned. CRRT stopped.     Electronically signed by Kailee Leyva RN on 10/4/2024 at 3:53 AM

## 2024-10-04 NOTE — PROGRESS NOTES
Patient Name: Shanna Mejia   Medical Record Number: [id number]  Date: 10/4/2024   Time: 4:14 AM   Room/Bed: M0E-67152124/2124-01  Central Line Placement Procedure Note  Indication: poor peripheral access    Consent: The spouse was counseled regarding the procedure, its indications, risks, potential complications and alternatives, and any questions were answered. Consent was obtained to proceed.    Procedure: The patient was positioned appropriately and the skin over the right femoral vein was prepped with betadine and draped in a sterile fashion. Local anesthesia was not performed due to the emergent nature of this procedure.  A large bore needle was used to identify the vein.  A guide wire was then inserted into the vein through the needle. A triple lumen catheter was then inserted into the vessel over the guide wire using the Seldinger technique.  All ports showed good, free flowing blood return and were flushed with saline solution.  The catheter was then securely fastened to the skin with sutures and covered with a sterile dressing.  A post procedure X-ray was not indicated.    The patient tolerated the procedure well.    Complications: bleeding 5cc    Electronically Signed by: @bryon@

## 2024-10-04 NOTE — PROGRESS NOTES
CRRT down d/t filter clotting @ 2215. Secure conversation w/ Tiffany. Orders to prime CRRT w/ heparin, as well as increase Pre to 800mL/hr.     Electronically signed by Kailee Leyva RN on 10/3/2024 at 11:14 PM

## 2024-10-04 NOTE — PROGRESS NOTES
AM HCO3: 10.3. secure message to Tiffany BARRON. Orders for x1 amp of bicarb. (See MAR)      Electronically signed by Kailee Leyva RN on 10/4/2024 at 6:11 AM

## 2024-10-04 NOTE — PROGRESS NOTES
CRRT restarted at 1407.     Electronically signed by Jessica L Kerley, RN on 10/4/2024 at 6:39 PM

## 2024-10-04 NOTE — PROGRESS NOTES
Pt TMP and filter # increasing on CRRT. Secure message to Tiffany BARRON. Orders for heparin syringe w/ CRRT. HCO3: 10 - start bicarb gtt. Stop D5/NS gtt.     Per Marlene NP give bicarb bolus for LA and recheck @ 0500.     Electronically signed by Kailee Leyva RN on 10/4/2024 at 2:18 AM

## 2024-10-04 NOTE — PROGRESS NOTES
Family wanting a head CT. Yousef MD Leidy notified.     Electronically signed by Jessica L Kerley, RN on 10/4/2024 at 11:19 AM

## 2024-10-04 NOTE — PROGRESS NOTES
aPTT: 148.7. RN spoke with Pharmacy. Heparin paused for 1 hour.    Electronically signed by Jessica L Kerley, RN on 10/4/2024 at 10:14 AM

## 2024-10-04 NOTE — ACP (ADVANCE CARE PLANNING)
Advance Care Planning     Palliative Team Advance Care Planning (ACP) Conversation    Date of Conversation: 10/04/24    Individuals present for the conversation: Daughter Listed below      ACP documents on file prior to discussion:  -None    Previously completed document/s not on file: Patient / participant reports that there are no previously executed ACP documents.    Healthcare Decision Maker:    Primary Decision Maker: Christen Lima - Child - [phone]    Secondary Decision Maker: Malini Pendleton - Child - [phone]    Secondary Decision Maker: Cathleen Vu - Child - [phone]     Conversation Summary:  I did speak with family again and they have decided to change code status to DNR CC continue with care for next 24 to 48 hours and see what physician have to say about her progress over the next few days.     Resuscitation Status:   Code Status: DNR-CC     Documentation Completed:  -Portable DNR    I spent 45 minutes with the patient and/or surrogate decision maker discussing the patient's wishes and goals.      Electronically signed by Polina VALLEJO, RN, CHPN on 10/4/2024 at 5:03 PM  Palliative Care Nurse  Phone [phone]

## 2024-10-04 NOTE — PROGRESS NOTES
NAME:  Shanna Mejia  YOB: 1955  MEDICAL RECORD NUMBER:  [id number]    Shift Summary: admit to ICU post ROSC after code blue during HD. Prop, levo, vaso, bicarb gtt. Issues w/ maintaining BG - received x3 D10 boluses & x1 amp D50, x2 doses solumedrol - will need D10 gtt. Bicarb critically low - started on 150meq gtt, received x2 amps bicarb. CRRT filter clogging frequently - changed twice. Primed w/ heparin 1st time set went down. Heparin syringe added the 2nd time set went down. Pt BP sensitive to changes w/ levo gtt - elia gtt added. Unable to keep pt -ve d/t CRRT set going down so frequently & multiple D10 boluses given. Cefepime started for (+) blood cultures. Palliative, wound care, vascular, nephro, CC c/s. Potential for thoracentesis today. [facility] following. Calcium replaced w/ 2g.     Family updated: Yes:  Cathleen @ bedside    Rhythm: Sinus Tachycardia     Most recent vitals:   Visit Vitals  /75   Pulse 100   Temp 97.8 °F (36.6 °C) (Esophageal)   Resp 26   Ht 1.727 m (5' 8\")   Wt 63.6 kg (140 lb 3.4 oz)   SpO2 (!) 62%   BMI 21.32 kg/m²      ABP (Arterial line BP): 104/59  ABP Mean (Arterial Line Mean): 69 mmHg    No data found.    No data found.      Respiratory support needed (if any):  - Ventilator Settings:  Vent Days 2    Vt (Set, mL): 0 mL  Resp Rate (Set): 18 bpm  FiO2 : 60 %    PEEP/CPAP (cmH2O): 8       Admission weight Weight - Scale: 63.4 kg (139 lb 12.4 oz) (10/03/24 2000)    Today's weight    Wt Readings from Last 1 Encounters:   10/04/24 63.6 kg (140 lb 3.4 oz)        Marks need assessed each shift: YES -  - continue marks r/t - fluid volume management  UOP >30ml/hr: NO - CRRT - nephro aware  Last documented BM (in last 48 hrs):  No data found.             Restraints (in use currently or dc'd in last 12 hrs): Yes    Order current and documentation up to date? Yes    Lines/Drains reviewed @ bedside.  CVC  10/03/24 Right Femoral (Active)   Number of days: 0       Peripheral IV

## 2024-10-04 NOTE — PROGRESS NOTES
CRRT restarted @ 2206. Filter primed w/ heparin.     Electronically signed by Kailee Leyva RN on 10/3/2024 at 11:15 PM

## 2024-10-04 NOTE — PLAN OF CARE
airway pressure, respiratory therapy assess nares and determine need for appliance change or resting period.  Outcome: Progressing     Problem: Respiratory - Adult  Goal: Achieves optimal ventilation and oxygenation  Outcome: Progressing     Problem: Skin/Tissue Integrity - Adult  Goal: Skin integrity remains intact  Outcome: Progressing  Flowsheets (Taken 10/3/2024 2000)  Skin Integrity Remains Intact: Monitor for areas of redness and/or skin breakdown  Goal: Oral mucous membranes remain intact  Outcome: Progressing     Problem: Musculoskeletal - Adult  Goal: Maintain proper alignment of affected body part  Outcome: Progressing     Problem: Gastrointestinal - Adult  Goal: Minimal or absence of nausea and vomiting  Outcome: Progressing  Goal: Maintains or returns to baseline bowel function  Outcome: Progressing     Problem: Genitourinary - Adult  Goal: Absence of urinary retention  Outcome: Progressing  Goal: Urinary catheter remains patent  Outcome: Progressing     Problem: Infection - Adult  Goal: Absence of infection at discharge  Outcome: Progressing  Goal: Absence of infection during hospitalization  Outcome: Progressing  Goal: Absence of fever/infection during anticipated neutropenic period  Outcome: Progressing     Problem: Metabolic/Fluid and Electrolytes - Adult  Goal: Hemodynamic stability and optimal renal function maintained  Outcome: Progressing     Problem: Discharge Planning  Goal: Discharge to home or other facility with appropriate resources  Outcome: Not Progressing     Problem: Neurosensory - Adult  Goal: Achieves stable or improved neurological status  Outcome: Not Progressing  Goal: Achieves maximal functionality and self care  Outcome: Not Progressing     Problem: Cardiovascular - Adult  Goal: Maintains optimal cardiac output and hemodynamic stability  Outcome: Not Progressing  Goal: Absence of cardiac dysrhythmias or at baseline  Outcome: Not Progressing     Problem: Musculoskeletal -  Adult  Goal: Return mobility to safest level of function  Outcome: Not Progressing  Goal: Return ADL status to a safe level of function  Outcome: Not Progressing     Problem: Gastrointestinal - Adult  Goal: Maintains adequate nutritional intake  Outcome: Not Progressing     Problem: Metabolic/Fluid and Electrolytes - Adult  Goal: Electrolytes maintained within normal limits  Outcome: Not Progressing  Goal: Glucose maintained within prescribed range  Outcome: Not Progressing     Problem: Hematologic - Adult  Goal: Maintains hematologic stability  Outcome: Not Progressing

## 2024-10-04 NOTE — PROGRESS NOTES
Filter down due to foam in chamber at 1212. 217mL of blood returned.     Electronically signed by Jessica L Kerley, RN on 10/4/2024 at 3:53 PM      Statement Selected

## 2024-10-04 NOTE — PROGRESS NOTES
Pt B - verbal conversation w/ MD. X1 amp d50. X2 doses solumedrol. Pt became hypotensive. Levophed titrated outside of order parameters per MD. Macho gtt order placed. (See MAR)    Electronically signed by Kailee Leyva RN on 10/4/2024 at 1:30 AM

## 2024-10-05 VITALS
SYSTOLIC BLOOD PRESSURE: 115 MMHG | WEIGHT: 140.21 LBS | HEIGHT: 68 IN | TEMPERATURE: 98.5 F | BODY MASS INDEX: 21.25 KG/M2 | DIASTOLIC BLOOD PRESSURE: 71 MMHG | RESPIRATION RATE: 18 BRPM | OXYGEN SATURATION: 72 %

## 2024-10-05 LAB — MRSA DNA SPEC QL NAA+PROBE: NORMAL

## 2024-10-05 NOTE — PLAN OF CARE
Problem: Chronic Conditions and Co-morbidities  Goal: Patient's chronic conditions and co-morbidity symptoms are monitored and maintained or improved  10/4/2024 2057 by Susan Dietrich RN  Outcome: Progressing     Problem: Safety - Adult  Goal: Free from fall injury  10/4/2024 2057 by Susan Dietrich RN  Outcome: Progressing     Problem: Coping  Goal: Pt/Family able to verbalize concerns and demonstrate effective coping strategies  Description: INTERVENTIONS:  1. Assist patient/family to identify coping skills, available support systems and cultural and spiritual values  2. Provide emotional support, including active listening and acknowledgement of concerns of patient and caregivers  3. Reduce environmental stimuli, as able  4. Instruct patient/family in relaxation techniques, as appropriate  5. Assess for spiritual pain/suffering and initiate Spiritual Care, Psychosocial Clinical Specialist consults as needed  10/4/2024 2057 by Susan Dietrich RN  Outcome: Progressing     Problem: Pain  Goal: Verbalizes/displays adequate comfort level or baseline comfort level  10/4/2024 2057 by Susan Dietrich RN  Outcome: Progressing     Problem: Skin/Tissue Integrity  Goal: Absence of new skin breakdown  Description: 1.  Monitor for areas of redness and/or skin breakdown  2.  Assess vascular access sites hourly  3.  Every 4-6 hours minimum:  Change oxygen saturation probe site  4.  Every 4-6 hours:  If on nasal continuous positive airway pressure, respiratory therapy assess nares and determine need for appliance change or resting period.  10/4/2024 2057 by Susan Dietrich RN  Outcome: Progressing     Problem: Neurosensory - Adult  Goal: Achieves stable or improved neurological status  10/4/2024 2057 by Susan Dietrich RN  Outcome: Progressing     Problem: Respiratory - Adult  Goal: Achieves optimal ventilation and oxygenation  10/4/2024 2057 by Susan Dietrich RN  Outcome: Progressing      Problem: Cardiovascular - Adult  Goal: Maintains optimal cardiac output and hemodynamic stability  10/4/2024 2057 by Susan Dietrich RN  Outcome: Progressing     Problem: Cardiovascular - Adult  Goal: Absence of cardiac dysrhythmias or at baseline  10/4/2024 2057 by Susan Dietrich RN  Outcome: Progressing     Problem: Skin/Tissue Integrity - Adult  Goal: Skin integrity remains intact  10/4/2024 2057 by Susan Dietrich RN  Outcome: Progressing     Problem: Skin/Tissue Integrity - Adult  Goal: Oral mucous membranes remain intact  10/4/2024 2057 by Susan Dietrich RN  Outcome: Progressing     Problem: Gastrointestinal - Adult  Goal: Minimal or absence of nausea and vomiting  10/4/2024 2057 by Susan Dietrich RN  Outcome: Progressing     Problem: Infection - Adult  Goal: Absence of infection at discharge  10/4/2024 2057 by Susan Dietrich RN  Outcome: Progressing     Problem: Infection - Adult  Goal: Absence of infection during hospitalization  10/4/2024 2057 by Susan Dietrich RN  Outcome: Progressing     Problem: Metabolic/Fluid and Electrolytes - Adult  Goal: Electrolytes maintained within normal limits  10/4/2024 2057 by Susan Dietrich RN  Outcome: Progressing

## 2024-10-05 NOTE — SIGNIFICANT EVENT
Called by nursing staff to pronounce patient who was made DNR CCA today.    Patient was examined and the following were absent: Pulses, Blood Pressure, and Respiratory effort.    I declared the patient dead on 10/4/2024  at 11:35 PM.

## 2024-10-05 NOTE — CONSULTS
Interventional Cardiology Consultation     Shanna Mejia  1955    PCP: Sona Thomas MD  Referring Physician: Dr. Boucher  Reason for Referral: PAD  Chief Complaint:   Chief Complaint   Patient presents with    Altered Mental Status     Per family pt is more altered then usual. Pt has missed 2 days of dialysis this week one day due to court. BS was 27 for ems and they gave her 1mg glucagon. Has vascular surgery 2 days ago with Karena       Subjective:     History of Present Illness: The patient is 68 y.o. female with a past medical history significant for end-stage renal disease on hemodialysis, diabetes, hypertension, coronary artery disease, LV systolic dysfunction ejection fraction 40% hyperlipidemia, severe peripheral arterial disease with previous bilateral intervention who presents with above complaint.  She had missed several episodes of dialysis she presented with altered mental status upon arrival to the emergency was Hospital ER of note her blood sugar was 27 and she had a pulseless cardiac arrest.  She is currently intubated and sedated on vasopressor support in the medical ICU.  Interventional cardiology is consulted for peripheral artery disease.      Past Medical History:   Diagnosis Date    Carotid artery stenosis     CHF (congestive heart failure) (HCC)     CKD (chronic kidney disease)     Coronary artery disease     Diabetes mellitus (HCC)     on no meds -[id number]    Dilated cardiomyopathy (HCC)     Gout     Hemodialysis patient (HCC)     Hyperlipidemia     Hypertension     Hypothyroidism     Ischemic stroke (HCC)     2016    Lung nodule     Neuropathy     Osteopenia     Peripheral arterial disease (HCC)     Peripheral neuropathy     Tobacco use disorder      Past Surgical History:   Procedure Laterality Date    CARDIAC CATHETERIZATION      CARDIAC PROCEDURE N/A 7/26/2024    Peripheral angiography performed by Maximo Boo MD at [facility] CARDIAC CATH LAB     Verify at 10/04/24 2202    lidocaine PF 1 % injection 50 mg  50 mg IntraDERmal Once Zakiya Allen MD        glucose chewable tablet 16 g  4 tablet Oral PRN Sona Thomas MD        dextrose bolus 10% 125 mL  125 mL IntraVENous PRN Sona Thomas MD   Stopped at 10/04/24 2043    Or    dextrose bolus 10% 250 mL  250 mL IntraVENous PRN Sona Thomas MD        glucagon injection 1 mg  1 mg SubCUTAneous PRN Sona Thomas MD        dextrose 10 % infusion   IntraVENous Continuous PRN Sona Thomas MD   Stopped at 10/04/24 1246    VASOpressin 20 Units in sodium chloride 0.9 % 100 mL infusion  0.01-0.03 Units/min IntraVENous Continuous Melissa Seth MD 9 mL/hr at 10/04/24 2202 0.03 Units/min at 10/04/24 2202    heparin (porcine) 5,000 Units in sodium chloride 0.9 % 1,000 mL infusion  25 mL/hr Dialysis Continuous PRN Yesenia Allen MD 25 mL/hr at 10/03/24 2302 25 mL/hr at 10/03/24 2302       Review of Systems:  Intubated and sedated    Physical Exam:   /71   Pulse (!) 106   Temp 98.5 °F (36.9 °C) (Esophageal)   Resp 19   Ht 1.727 m (5' 8\")   Wt 63.6 kg (140 lb 3.4 oz)   SpO2 90% Comment: ear probe  BMI 21.32 kg/m²   Wt Readings from Last 3 Encounters:   10/04/24 63.6 kg (140 lb 3.4 oz)   10/01/24 58.5 kg (129 lb)   09/19/24 58.5 kg (129 lb)     Constitutional: Appears cachectic thin older than stated age  Head: Normocephalic and atraumatic. Pupils equal and round.  Neck: Neck supple. No JVP or carotid bruit appreciated. No mass and no thyromegaly present. No lymphadenopathy present.  Cardiovascular: Normal rate. Normal heart sounds. Exam reveals no gallop and no friction rub. No murmur heard.  Pulmonary/Chest: Effort normal and breath sounds normal. No respiratory distress. She has no wheezes, rhonchi or rales.   Abdominal: Soft, non-tender. Bowel sounds are normal. She exhibits no organomegaly, mass or bruit.   Extremities: Multiple peripheral arterial ulcers on several  digits  Neurological: No gross cranial nerve deficit. Coordination normal.   Skin: Skin is warm and dry. There is no rash or diaphoresis.   Psychiatric: Sedated         Pulses:  Left and right PT and DP are nonpalpable    Lab Review:   FLP:    Lab Results   Component Value Date/Time    TRIG 102 05/02/2023 10:26 AM    HDL 41 05/02/2023 10:26 AM     BUN/Creatinine:    Lab Results   Component Value Date/Time    BUN 21 10/04/2024 05:48 PM    CREATININE 1.7 10/04/2024 05:48 PM     PT/INR, TNI, HGB A1C:   Lab Results   Component Value Date/Time    TROPONINI 0.06 (H) 02/12/2023 08:41 PM    LABA1C 6.1 09/02/2024 03:33 PM      No results found for: \"CBCAUTODIF\"      All above diagnostic testing and laboratory data was independently visualized and reviewed by me (not simply review of report)       Assessment and Plan   1) PAD  - chronic limb threatening ischemia  - no intervention at this time  - no acute changes on this admission    2) PEA arrest  - noncardiac  - anoxic injury   - management as per critical care     3) esrd  - missed several dialysis sessions  - as per nephrology     No acute cardiovascular intervention at this time  Code status changed to DNR CC    Will follow from the periphery    Overall, the problems requiring hospitalization are high in severity     Thank you very much for allowing me to participate in the care of your patient. Please do not hesitate to contact me if you have any questions.      Remberto Thurston MD FACC  General, Interventional Cardiology, and Peripheral Vascular Disease   [facility]   Ph: [phone]  Fax: [phone]

## 2024-10-05 NOTE — PROGRESS NOTES
Late entry due to patient care:    - CRRT running without issue. BP 96/70 (74) with titrating pressors per order. Patient remains unresponsive to stimuli.     230: BP and MAP quickly dropping. BP 72/48 (56) - secure message sent to SAMUEL Nichols to titrate pressors outside of parameters in order to meet MAP goal.      2310: BP dropped to 24/18 (20) on A-line. Unable to palpate radial or carotid pulse. V-fib on monitor.    2315: Asystole on monitor. No pulses present- A-line unable to read a pressure. Patient remains vented. Secure message sent to SAMUEL Nichols. Savita RT  notified that patient  and to remove from mechanical ventilation.     2335: Emilia Gibbs MD at bedside to pronounce patient.     2340: [facility] notified of patient death. Referral number [id number]. [facility] until [facility] can speak with family.     2346: spoke with Angela at  office. Not  case- body released.     0030: multiple family members at bedside.  home information as follows: [place]  [facility] in [facility] [phone]

## 2024-10-05 NOTE — PROGRESS NOTES
Hospitalist Progress Note      PCP: Sona Thomas MD    Date of Admission: 10/3/2024    Subjective: cont to be intubated, started on pressors, was started on RRT    Medications:  Reviewed    Infusion Medications    phenylephrine (GIULIANA-SYNEPHRINE) 50 mg in sodium chloride 0.9 % 250 mL infusion Stopped (10/04/24 0808)    heparin (porcine) 5,000 Units in sodium chloride 0.9 % 20 mL infusion 1.2 mL/hr at 10/04/24 1051    prismaSol BGK 2/3.5 1,500 mL/hr at 10/04/24 1911    prismaSol BGK 2/3.5 1,000 mL/hr at 10/04/24 1911    sterile water 1,000 mL with sodium bicarbonate 150 mEq solution 500 mL/hr at 10/04/24 2014    dextrose 50 mL/hr at 10/04/24 1959    norepinephrine 11 mcg/min (10/04/24 1959)    propofol Stopped (10/04/24 0024)    sodium chloride 5 mL/hr at 10/04/24 1959    dextrose Stopped (10/04/24 1246)    VASOpressin 20 Units in sodium chloride 0.9 % 100 mL infusion 0.03 Units/min (10/04/24 1959)    heparin (porcine) 5,000 Units in sodium chloride 0.9 % 1,000 mL infusion 25 mL/hr (10/03/24 2302)     Scheduled Medications    cefepime  2,000 mg IntraVENous Q8H    linezolid  600 mg IntraVENous Q12H    aspirin  81 mg Oral Daily    atorvastatin  40 mg Oral Daily    budesonide-formoterol  2 puff Inhalation BID RT    [Held by provider] calcitRIOL  2.25 mcg Oral Once per day on Monday Wednesday Friday    [Held by provider] carvedilol  6.25 mg Oral BID WC    clopidogrel  75 mg Oral Daily    gabapentin  300 mg Oral QHS    isosorbide mononitrate  30 mg Oral Daily    levothyroxine  200 mcg Oral Daily    levothyroxine  25 mcg Oral Daily    insulin lispro  0-4 Units SubCUTAneous TID WC    insulin lispro  0-4 Units SubCUTAneous Nightly    [Held by provider] heparin (porcine)  5,000 Units SubCUTAneous 3 times per day    pantoprazole  40 mg Oral QAM AC    sodium chloride flush  5-40 mL IntraVENous 2 times per day    lidocaine 1 % injection  50 mg IntraDERmal Once     PRN Meds: phenylephrine (GIULIANA-SYNEPHRINE) 50 mg in  seconds  Peripheral Pulses: +3 Easily felt, not easily obliterated with pressure       Labs:   Recent Labs     10/04/24  0205 10/04/24  0522 10/04/24  1246   WBC 6.0 6.6 9.9   HGB 9.9* 9.5* 8.8*   HCT 30.5* 29.6* 27.5*    158 120*     Recent Labs     10/04/24  0200 10/04/24  1026 10/04/24  1748   * 131* 128*   K 5.1 6.2* 5.9*    97* 89*   CO2 12* 10* 16*   BUN 38* 24* 21*   CREATININE 3.3* 2.2* 1.7*   CALCIUM 6.5* 6.6* 6.9*   PHOS 5.4* 4.8 5.3*     Recent Labs     10/03/24  1304   AST 29   ALT 8*   BILIDIR 0.5*   BILITOT 0.9   ALKPHOS 87     Recent Labs     10/03/24  1304 10/04/24  0815   INR 1.53* 3.33*     Recent Labs     10/03/24  1304 10/03/24  1400 10/03/24  1446   TROPHS 492* 478* 466*       Urinalysis:      Lab Results   Component Value Date/Time    NITRU Negative 10/04/2024 06:04 AM    WBCUA 9 10/04/2024 06:04 AM    BACTERIA None Seen 10/04/2024 06:04 AM    RBCUA 54 10/04/2024 06:04 AM    BLOODU LARGE 10/04/2024 06:04 AM    GLUCOSEU Negative 10/04/2024 06:04 AM       Radiology:  CT HEAD WO CONTRAST   Preliminary Result   No acute intracranial abnormality.         XR CHEST PORTABLE   Final Result   1. Endotracheal tube in good position.   2. Enteric catheter side hole in the region of the GE junction or just above,   with the tip in the proximal stomach.   3. Moderate right-sided pleural effusion.   4. Cardiomegaly with mild central vascular congestion.         CT HEAD WO CONTRAST   Final Result   No acute intracranial abnormality, or obvious significant interval change.      Severely limited secondary to motion and positioning.  Consider follow-up   study as appropriate.         XR CHEST PORTABLE   Final Result   Large right pleural effusion increased in size since prior examination.      Right basilar opacity may represent underlying consolidation from pneumonia   versus atelectasis.      Cardiomegaly with interstitial pulmonary edema.  Findings suggest CHF.         US THORACENTESIS Which

## 2024-10-06 LAB
BACTERIA BLD CULT: ABNORMAL
BACTERIA BLD CULT: ABNORMAL
EKG ATRIAL RATE: 111 BPM
EKG DIAGNOSIS: NORMAL
EKG Q-T INTERVAL: 416 MS
EKG QRS DURATION: 170 MS
EKG QTC CALCULATION (BAZETT): 565 MS
EKG R AXIS: -87 DEGREES
EKG T AXIS: 87 DEGREES
EKG VENTRICULAR RATE: 111 BPM
ORGANISM: ABNORMAL
ORGANISM: ABNORMAL

## 2024-10-06 PROCEDURE — 93010 ELECTROCARDIOGRAM REPORT: CPT | Performed by: INTERNAL MEDICINE

## 2024-10-07 LAB
BACTERIA BLD CULT ORG #2: NORMAL
BACTERIA BLD CULT: NORMAL

## 2024-10-07 NOTE — DISCHARGE SUMMARY
Hospitalist Discharge Summary   10/6/2024 8:01 PM  Subjective:   Admit Date: 9/2/2024  PCP: Sona Thomas MD  Interval History: pt is ready for the discharge  Feels better  Rest of the events as in progress notes and chart  Admitted for SOB and edema  Rxed for the critical limb ischemia and cellulitis  Seen by the nephrology and cardiology  Pt started to feel better  Denies any other complaints  Chart reviewed.  Diet: No diet orders on file  Medications:   Scheduled Meds:  Continuous Infusions:  CBC:   Recent Labs     10/04/24  0205 10/04/24  0522 10/04/24  1246   WBC 6.0 6.6 9.9   HGB 9.9* 9.5* 8.8*    158 120*     BMP:    Recent Labs     10/04/24  0200 10/04/24  1026 10/04/24  1748   * 131* 128*   K 5.1 6.2* 5.9*    97* 89*   CO2 12* 10* 16*   BUN 38* 24* 21*   CREATININE 3.3* 2.2* 1.7*   GLUCOSE 116* 132* 51*     Hepatic: No results for input(s): \"AST\", \"ALT\", \"BILITOT\", \"ALKPHOS\" in the last 72 hours.    Invalid input(s): \"ALB\"  Troponin: No results for input(s): \"TROPONINI\" in the last 72 hours.  BNP: No results for input(s): \"BNP\" in the last 72 hours.  Lipids: No results for input(s): \"CHOL\", \"HDL\" in the last 72 hours.    Invalid input(s): \"LDLCALCU\"  INR:   Recent Labs     10/04/24  0815   INR 3.33*     Orders Placed This Encounter   Procedures    Culture, Blood 1     Standing Status:   Standing     Number of Occurrences:   1    Culture, Blood 2     Standing Status:   Standing     Number of Occurrences:   1    XR FOOT LEFT (MIN 3 VIEWS)     Standing Status:   Standing     Number of Occurrences:   1     Order Specific Question:   Reason for exam:     Answer:   eval for infection, subq air    XR TIBIA FIBULA RIGHT (2 VIEWS)     Standing Status:   Standing     Number of Occurrences:   1     Order Specific Question:   Reason for exam:     Answer:   ifnection    Basic Metabolic Panel     Standing Status:   Standing     Number of Occurrences:   1    CBC with Auto Differential

## 2024-10-12 NOTE — PROGRESS NOTES
Physician Progress Note      PATIENT:               EDGAR AN  CSN #:                  [id number]  :                       1955  ADMIT DATE:       10/3/2024 12:51 PM  DISCH DATE:        10/5/2024 3:12 AM  RESPONDING  PROVIDER #:        Serenity BRADFORD MD          QUERY TEXT:    Pt admitted 10/3 with fluid overload due to missing dialysis and AMS.  As   dialysis progressed, became increasingly hypotensive, less responsive, and   code blue called.  On 10/4, \"Large right pleural effusion with PNA\" and   \"septic shock\" documented per critical care consult.  If possible, please   document in progress notes and discharge summary the present on admission   status of sepsis:    The medical record reflects the following:  Risk Factors: pneumonia, positive blood cultures, AMS  Clinical Indicators:  On arrival, glucose 20, lactic acid 1.6, WBC 5.8, blood   culture showed Morganella and Enterobacter; BP on arrival 96/61 dropping to   58/33 4 hrs later; 4 hrs after arrival, lactic acid up to 4.8; developed acute   respiratory failure  Treatment: intubation, ventilator, Cefepime, Zyvox, nephrology, cardiology,   and critical care consults  Options provided:  -- Sepsis was present on admission  -- Sepsis was not present on admission and developed following admission  -- Other - I will add my own diagnosis  -- Disagree - Not applicable / Not valid  -- Disagree - Clinically unable to determine / Unknown  -- Refer to Clinical Documentation Reviewer    PROVIDER RESPONSE TEXT:    Sepsis was present on admission    Query created by: Christine Adames on 10/10/2024 4:10 AM      Electronically signed by:  Serenity BRADFORD MD 10/12/2024 2:47 PM

## 2024-10-13 PROBLEM — I73.9 PERIPHERAL VASCULAR DISEASE, UNSPECIFIED (HCC): Status: ACTIVE | Noted: 2024-10-13

## 2024-10-13 LAB — ECHO BSA: 1.68 M2

## (undated) DEVICE — ARMADA 35 PTA CATHETER 8 MM X 60 MM X 80 CM / OVER-THE-WIRE: Brand: ARMADA

## (undated) DEVICE — HI-TORQUE COMMAND ES GUIDE WIRE .014" 300 CM: Brand: HI-TORQUE COMMAND

## (undated) DEVICE — RADIFOCUS GLIDEWIRE ADVANTAGE GUIDEWIRE: Brand: GLIDEWIRE ADVANTAGE

## (undated) DEVICE — CATHETER ANGIO 5FR L100CM GRY S STL NYL JR4 3 SEG BRAID L

## (undated) DEVICE — ARMADA 14 PTA CATHETER 4.0 MM X 120 MM X 150 CM / OVER-THE-WIRE: Brand: ARMADA

## (undated) DEVICE — PINNACLE INTRODUCER SHEATH: Brand: PINNACLE

## (undated) DEVICE — DESTINATION PERIPHERAL GUIDING SHEATH: Brand: DESTINATION

## (undated) DEVICE — ARMADA 14 PTA CATHETER 3.0 MM X 120 MM X 150 CM / OVER-THE-WIRE: Brand: ARMADA

## (undated) DEVICE — HI-TORQUE COMMAND 18 ST GUIDE WIRE .018" 10 CM / 4 G, 300 CM: Brand: HI-TORQUE COMMAND

## (undated) DEVICE — Device

## (undated) DEVICE — CATHETER 5FR IM CORDIS 100CM

## (undated) DEVICE — GUIDEWIRE VASC L150CM DIA0.035IN FLX END L7CM J 3MM PTFE

## (undated) DEVICE — CATHETER IVL SHOCKWAVE  7.0MM 135CM

## (undated) DEVICE — NAVICROSS SUPPORT CATHETER: Brand: NAVICROSS

## (undated) DEVICE — HI-TORQUE CONNECT 250T GUIDE WIRE .018" 300 CM: Brand: HI-TORQUE CONNECT

## (undated) DEVICE — CATHETER 5FR CORDIS PIG 145DEG 110CM